# Patient Record
Sex: FEMALE | Race: BLACK OR AFRICAN AMERICAN | NOT HISPANIC OR LATINO | Employment: UNEMPLOYED | URBAN - METROPOLITAN AREA
[De-identification: names, ages, dates, MRNs, and addresses within clinical notes are randomized per-mention and may not be internally consistent; named-entity substitution may affect disease eponyms.]

---

## 2022-05-01 ENCOUNTER — ANESTHESIA EVENT (OUTPATIENT)
Dept: SURGERY | Facility: HOSPITAL | Age: 42
DRG: 746 | End: 2022-05-01
Payer: COMMERCIAL

## 2022-05-01 ENCOUNTER — HOSPITAL ENCOUNTER (INPATIENT)
Facility: HOSPITAL | Age: 42
LOS: 4 days | Discharge: NURSING, REHAB OR LONG-TERM CARE | DRG: 746 | End: 2022-05-05
Attending: SURGERY | Admitting: SURGERY
Payer: COMMERCIAL

## 2022-05-01 ENCOUNTER — ANESTHESIA (OUTPATIENT)
Dept: SURGERY | Facility: HOSPITAL | Age: 42
DRG: 746 | End: 2022-05-01
Payer: COMMERCIAL

## 2022-05-01 DIAGNOSIS — N76.82 FOURNIER'S GANGRENE IN FEMALE: ICD-10-CM

## 2022-05-01 PROBLEM — R65.10 SIRS (SYSTEMIC INFLAMMATORY RESPONSE SYNDROME): Status: ACTIVE | Noted: 2022-05-01

## 2022-05-01 PROBLEM — E11.9 NEWLY DIAGNOSED DIABETES: Status: ACTIVE | Noted: 2022-05-01

## 2022-05-01 PROBLEM — D63.8 ANEMIA, CHRONIC DISEASE: Status: ACTIVE | Noted: 2022-05-01

## 2022-05-01 PROBLEM — E11.65 UNCONTROLLED TYPE 2 DIABETES MELLITUS WITH HYPERGLYCEMIA: Status: ACTIVE | Noted: 2022-05-01

## 2022-05-01 LAB
ALBUMIN SERPL-MCNC: 2 GM/DL (ref 3.5–5)
ALBUMIN/GLOB SERPL: 0.5 RATIO (ref 1.1–2)
ALP SERPL-CCNC: 128 UNIT/L (ref 40–150)
ALT SERPL-CCNC: 13 UNIT/L (ref 0–55)
ANISOCYTOSIS BLD QL SMEAR: SLIGHT
APTT PPP: 37.1 SECONDS (ref 23.2–33.7)
AST SERPL-CCNC: 18 UNIT/L (ref 5–34)
B-HCG SERPL QL: NEGATIVE
BASOPHILS # BLD AUTO: 0.04 X10(3)/MCL (ref 0–0.2)
BASOPHILS NFR BLD AUTO: 0.1 %
BILIRUBIN DIRECT+TOT PNL SERPL-MCNC: 0.2 MG/DL (ref 0–0.5)
BILIRUBIN DIRECT+TOT PNL SERPL-MCNC: 0.2 MG/DL (ref 0–0.8)
BILIRUBIN DIRECT+TOT PNL SERPL-MCNC: 0.4 MG/DL
BUN SERPL-MCNC: 8 MG/DL (ref 7–18.7)
CALCIUM SERPL-MCNC: 8.4 MG/DL (ref 8.4–10.2)
CHLORIDE SERPL-SCNC: 109 MMOL/L (ref 98–107)
CO2 SERPL-SCNC: 22 MMOL/L (ref 22–29)
CREAT SERPL-MCNC: 0.77 MG/DL (ref 0.55–1.02)
EOSINOPHIL # BLD AUTO: 0.1 X10(3)/MCL (ref 0–0.9)
EOSINOPHIL NFR BLD AUTO: 0.4 %
ERYTHROCYTE [DISTWIDTH] IN BLOOD BY AUTOMATED COUNT: 15 % (ref 11.5–17)
GLOBULIN SER-MCNC: 4.1 GM/DL (ref 2.4–3.5)
GLUCOSE SERPL-MCNC: 256 MG/DL (ref 74–100)
GLUCOSE SERPL-MCNC: 265 MG/DL (ref 70–110)
HCT VFR BLD AUTO: 27.6 % (ref 37–47)
HGB BLD-MCNC: 8.8 GM/DL (ref 12–16)
HYPOCHROMIA BLD QL SMEAR: ABNORMAL
IMM GRANULOCYTES # BLD AUTO: 0.95 X10(3)/MCL (ref 0–0.02)
IMM GRANULOCYTES NFR BLD AUTO: 3.5 % (ref 0–0.43)
INR BLD: 1.54 (ref 0–1.3)
LYMPHOCYTES # BLD AUTO: 2.96 X10(3)/MCL (ref 0.6–4.6)
LYMPHOCYTES NFR BLD AUTO: 10.8 %
LYMPHOCYTES NFR BLD MANUAL: 17 % (ref 13–40)
LYMPHOCYTES NFR BLD MANUAL: 4658 /MCL (ref 3400–13700)
MCH RBC QN AUTO: 27.1 PG (ref 27–31)
MCHC RBC AUTO-ENTMCNC: 31.9 MG/DL (ref 33–36)
MCV RBC AUTO: 84.9 FL (ref 80–94)
MONOCYTES # BLD AUTO: 2.54 X10(3)/MCL (ref 0.1–1.3)
MONOCYTES NFR BLD AUTO: 9.3 %
MONOCYTES NFR BLD MANUAL: 2192 /MCL (ref 100–1300)
MONOCYTES NFR BLD MANUAL: 8 % (ref 2–11)
NEUTROPHILS # BLD AUTO: 20.8 X10(3)/MCL (ref 2.1–9.2)
NEUTROPHILS NFR BLD AUTO: 75.9 %
NEUTROPHILS NFR BLD MANUAL: 68 % (ref 47–80)
NEUTS BAND NFR BLD MANUAL: 7 % (ref 0–11)
PLATELET # BLD AUTO: 341 X10(3)/MCL (ref 130–400)
PLATELET # BLD EST: ADEQUATE 10*3/UL
PMV BLD AUTO: 10 FL (ref 9.4–12.4)
POCT GLUCOSE: 246 MG/DL (ref 70–110)
POCT GLUCOSE: 352 MG/DL (ref 70–110)
POCT GLUCOSE: 352 MG/DL (ref 70–110)
POTASSIUM SERPL-SCNC: 3.7 MMOL/L (ref 3.5–5.1)
PROT SERPL-MCNC: 6.1 GM/DL (ref 6.4–8.3)
PROTHROMBIN TIME: 18.4 SECONDS (ref 12.5–14.5)
RBC # BLD AUTO: 3.25 X10(6)/MCL (ref 4.2–5.4)
RBC MORPH BLD: ABNORMAL
SODIUM SERPL-SCNC: 137 MMOL/L (ref 136–145)
WBC # SPEC AUTO: 27.4 X10(3)/MCL (ref 4.5–11.5)

## 2022-05-01 PROCEDURE — 85025 COMPLETE CBC W/AUTO DIFF WBC: CPT | Performed by: STUDENT IN AN ORGANIZED HEALTH CARE EDUCATION/TRAINING PROGRAM

## 2022-05-01 PROCEDURE — 36415 COLL VENOUS BLD VENIPUNCTURE: CPT | Performed by: SURGERY

## 2022-05-01 PROCEDURE — 87102 FUNGUS ISOLATION CULTURE: CPT | Performed by: SURGERY

## 2022-05-01 PROCEDURE — 84703 CHORIONIC GONADOTROPIN ASSAY: CPT | Performed by: SURGERY

## 2022-05-01 PROCEDURE — 96366 THER/PROPH/DIAG IV INF ADDON: CPT

## 2022-05-01 PROCEDURE — 36415 COLL VENOUS BLD VENIPUNCTURE: CPT | Performed by: STUDENT IN AN ORGANIZED HEALTH CARE EDUCATION/TRAINING PROGRAM

## 2022-05-01 PROCEDURE — 71000033 HC RECOVERY, INTIAL HOUR: Performed by: SURGERY

## 2022-05-01 PROCEDURE — 63600175 PHARM REV CODE 636 W HCPCS

## 2022-05-01 PROCEDURE — 37000008 HC ANESTHESIA 1ST 15 MINUTES: Performed by: SURGERY

## 2022-05-01 PROCEDURE — 80053 COMPREHEN METABOLIC PANEL: CPT | Performed by: STUDENT IN AN ORGANIZED HEALTH CARE EDUCATION/TRAINING PROGRAM

## 2022-05-01 PROCEDURE — 96361 HYDRATE IV INFUSION ADD-ON: CPT

## 2022-05-01 PROCEDURE — 96376 TX/PRO/DX INJ SAME DRUG ADON: CPT

## 2022-05-01 PROCEDURE — 63600175 PHARM REV CODE 636 W HCPCS: Performed by: INTERNAL MEDICINE

## 2022-05-01 PROCEDURE — 63600175 PHARM REV CODE 636 W HCPCS: Performed by: NURSE ANESTHETIST, CERTIFIED REGISTERED

## 2022-05-01 PROCEDURE — 36000707: Performed by: SURGERY

## 2022-05-01 PROCEDURE — 25000003 PHARM REV CODE 250

## 2022-05-01 PROCEDURE — 87070 CULTURE OTHR SPECIMN AEROBIC: CPT | Performed by: SURGERY

## 2022-05-01 PROCEDURE — 37000009 HC ANESTHESIA EA ADD 15 MINS: Performed by: SURGERY

## 2022-05-01 PROCEDURE — 85730 THROMBOPLASTIN TIME PARTIAL: CPT | Performed by: STUDENT IN AN ORGANIZED HEALTH CARE EDUCATION/TRAINING PROGRAM

## 2022-05-01 PROCEDURE — 63600175 PHARM REV CODE 636 W HCPCS: Performed by: SURGERY

## 2022-05-01 PROCEDURE — 96367 TX/PROPH/DG ADDL SEQ IV INF: CPT

## 2022-05-01 PROCEDURE — 25000003 PHARM REV CODE 250: Performed by: SURGERY

## 2022-05-01 PROCEDURE — 85610 PROTHROMBIN TIME: CPT | Performed by: STUDENT IN AN ORGANIZED HEALTH CARE EDUCATION/TRAINING PROGRAM

## 2022-05-01 PROCEDURE — 25000003 PHARM REV CODE 250: Performed by: NURSE ANESTHETIST, CERTIFIED REGISTERED

## 2022-05-01 PROCEDURE — 25000003 PHARM REV CODE 250: Performed by: STUDENT IN AN ORGANIZED HEALTH CARE EDUCATION/TRAINING PROGRAM

## 2022-05-01 PROCEDURE — 96375 TX/PRO/DX INJ NEW DRUG ADDON: CPT

## 2022-05-01 PROCEDURE — 63600175 PHARM REV CODE 636 W HCPCS: Performed by: STUDENT IN AN ORGANIZED HEALTH CARE EDUCATION/TRAINING PROGRAM

## 2022-05-01 PROCEDURE — 99999 HC NO LEVEL OF SERVICE - ED ONLY: CPT

## 2022-05-01 PROCEDURE — C9399 UNCLASSIFIED DRUGS OR BIOLOG: HCPCS

## 2022-05-01 PROCEDURE — 11000001 HC ACUTE MED/SURG PRIVATE ROOM

## 2022-05-01 PROCEDURE — 96365 THER/PROPH/DIAG IV INF INIT: CPT

## 2022-05-01 PROCEDURE — 36000706: Performed by: SURGERY

## 2022-05-01 PROCEDURE — 87075 CULTR BACTERIA EXCEPT BLOOD: CPT | Performed by: SURGERY

## 2022-05-01 RX ORDER — SODIUM CHLORIDE 9 MG/ML
INJECTION, SOLUTION INTRAVENOUS CONTINUOUS
Status: DISCONTINUED | OUTPATIENT
Start: 2022-05-01 | End: 2022-05-05 | Stop reason: HOSPADM

## 2022-05-01 RX ORDER — INSULIN GLARGINE 100 [IU]/ML
INJECTION, SOLUTION SUBCUTANEOUS
Status: DISPENSED
Start: 2022-05-01 | End: 2022-05-01

## 2022-05-01 RX ORDER — HYDROMORPHONE HYDROCHLORIDE 2 MG/ML
INJECTION, SOLUTION INTRAMUSCULAR; INTRAVENOUS; SUBCUTANEOUS
Status: DISCONTINUED | OUTPATIENT
Start: 2022-05-01 | End: 2022-05-01

## 2022-05-01 RX ORDER — ONDANSETRON 2 MG/ML
4 INJECTION INTRAMUSCULAR; INTRAVENOUS EVERY 8 HOURS PRN
Status: DISCONTINUED | OUTPATIENT
Start: 2022-05-01 | End: 2022-05-05 | Stop reason: HOSPADM

## 2022-05-01 RX ORDER — TALC
6 POWDER (GRAM) TOPICAL NIGHTLY PRN
Status: DISCONTINUED | OUTPATIENT
Start: 2022-05-01 | End: 2022-05-05 | Stop reason: HOSPADM

## 2022-05-01 RX ORDER — KETOROLAC TROMETHAMINE 30 MG/ML
30 INJECTION, SOLUTION INTRAMUSCULAR; INTRAVENOUS EVERY 6 HOURS PRN
Status: DISPENSED | OUTPATIENT
Start: 2022-05-01 | End: 2022-05-04

## 2022-05-01 RX ORDER — INSULIN LISPRO 100 [IU]/ML
INJECTION, SOLUTION INTRAVENOUS; SUBCUTANEOUS
Status: COMPLETED
Start: 2022-05-01 | End: 2022-05-01

## 2022-05-01 RX ORDER — ONDANSETRON 2 MG/ML
4 INJECTION INTRAMUSCULAR; INTRAVENOUS EVERY 12 HOURS PRN
Status: DISCONTINUED | OUTPATIENT
Start: 2022-05-01 | End: 2022-05-05 | Stop reason: HOSPADM

## 2022-05-01 RX ORDER — LIDOCAINE HCL/PF 100 MG/5ML
SYRINGE (ML) INTRAVENOUS
Status: DISCONTINUED | OUTPATIENT
Start: 2022-05-01 | End: 2022-05-01

## 2022-05-01 RX ORDER — HYDROCODONE BITARTRATE AND ACETAMINOPHEN 5; 325 MG/1; MG/1
1 TABLET ORAL EVERY 8 HOURS PRN
Status: DISCONTINUED | OUTPATIENT
Start: 2022-05-01 | End: 2022-05-05 | Stop reason: HOSPADM

## 2022-05-01 RX ORDER — SODIUM CHLORIDE 9 MG/ML
INJECTION, SOLUTION INTRAVENOUS CONTINUOUS
Status: CANCELLED | OUTPATIENT
Start: 2022-05-01

## 2022-05-01 RX ORDER — PROMETHAZINE HYDROCHLORIDE 25 MG/ML
6.25 INJECTION, SOLUTION INTRAMUSCULAR; INTRAVENOUS ONCE AS NEEDED
Status: CANCELLED | OUTPATIENT
Start: 2022-05-01 | End: 2033-09-27

## 2022-05-01 RX ORDER — INSULIN ASPART 100 [IU]/ML
1-10 INJECTION, SOLUTION INTRAVENOUS; SUBCUTANEOUS EVERY 6 HOURS PRN
Status: DISCONTINUED | OUTPATIENT
Start: 2022-05-01 | End: 2022-05-05 | Stop reason: HOSPADM

## 2022-05-01 RX ORDER — SODIUM CHLORIDE 0.9 % (FLUSH) 0.9 %
10 SYRINGE (ML) INJECTION
Status: DISCONTINUED | OUTPATIENT
Start: 2022-05-01 | End: 2022-05-05 | Stop reason: HOSPADM

## 2022-05-01 RX ORDER — PROPOFOL 10 MG/ML
INJECTION, EMULSION INTRAVENOUS
Status: DISCONTINUED | OUTPATIENT
Start: 2022-05-01 | End: 2022-05-01

## 2022-05-01 RX ORDER — SILVER NITRATE 38.21; 12.74 MG/1; MG/1
STICK TOPICAL
Status: COMPLETED
Start: 2022-05-01 | End: 2022-05-01

## 2022-05-01 RX ORDER — MORPHINE SULFATE 4 MG/ML
2 INJECTION, SOLUTION INTRAMUSCULAR; INTRAVENOUS EVERY 4 HOURS PRN
Status: DISCONTINUED | OUTPATIENT
Start: 2022-05-01 | End: 2022-05-05 | Stop reason: HOSPADM

## 2022-05-01 RX ORDER — KETOROLAC TROMETHAMINE 30 MG/ML
30 INJECTION, SOLUTION INTRAMUSCULAR; INTRAVENOUS EVERY 8 HOURS PRN
Status: DISCONTINUED | OUTPATIENT
Start: 2022-05-01 | End: 2022-05-01

## 2022-05-01 RX ORDER — HYDROMORPHONE HYDROCHLORIDE 2 MG/ML
0.5 INJECTION, SOLUTION INTRAMUSCULAR; INTRAVENOUS; SUBCUTANEOUS
Status: CANCELLED | OUTPATIENT
Start: 2022-05-01

## 2022-05-01 RX ORDER — IBUPROFEN 600 MG/1
600 TABLET ORAL EVERY 6 HOURS PRN
Status: DISCONTINUED | OUTPATIENT
Start: 2022-05-01 | End: 2022-05-01

## 2022-05-01 RX ORDER — MORPHINE SULFATE 4 MG/ML
4 INJECTION, SOLUTION INTRAMUSCULAR; INTRAVENOUS EVERY 4 HOURS PRN
Status: DISCONTINUED | OUTPATIENT
Start: 2022-05-01 | End: 2022-05-01

## 2022-05-01 RX ORDER — DEXAMETHASONE SODIUM PHOSPHATE 4 MG/ML
INJECTION, SOLUTION INTRA-ARTICULAR; INTRALESIONAL; INTRAMUSCULAR; INTRAVENOUS; SOFT TISSUE
Status: DISCONTINUED | OUTPATIENT
Start: 2022-05-01 | End: 2022-05-01

## 2022-05-01 RX ORDER — TIZANIDINE 4 MG/1
4 TABLET ORAL EVERY 8 HOURS PRN
Status: DISCONTINUED | OUTPATIENT
Start: 2022-05-01 | End: 2022-05-05 | Stop reason: HOSPADM

## 2022-05-01 RX ORDER — SILVER NITRATE 38.21; 12.74 MG/1; MG/1
STICK TOPICAL
Status: DISPENSED
Start: 2022-05-01 | End: 2022-05-02

## 2022-05-01 RX ORDER — PIPERACILLIN SODIUM, TAZOBACTAM SODIUM 4; .5 G/20ML; G/20ML
INJECTION, POWDER, LYOPHILIZED, FOR SOLUTION INTRAVENOUS
Status: DISPENSED
Start: 2022-05-01 | End: 2022-05-01

## 2022-05-01 RX ORDER — HYDROMORPHONE HYDROCHLORIDE 2 MG/ML
0.2 INJECTION, SOLUTION INTRAMUSCULAR; INTRAVENOUS; SUBCUTANEOUS EVERY 4 HOURS PRN
Status: DISCONTINUED | OUTPATIENT
Start: 2022-05-01 | End: 2022-05-05 | Stop reason: HOSPADM

## 2022-05-01 RX ORDER — GLUCAGON 1 MG
1 KIT INJECTION
Status: DISCONTINUED | OUTPATIENT
Start: 2022-05-01 | End: 2022-05-05 | Stop reason: HOSPADM

## 2022-05-01 RX ORDER — FENTANYL CITRATE 50 UG/ML
INJECTION, SOLUTION INTRAMUSCULAR; INTRAVENOUS
Status: DISCONTINUED | OUTPATIENT
Start: 2022-05-01 | End: 2022-05-01

## 2022-05-01 RX ORDER — LABETALOL HYDROCHLORIDE 5 MG/ML
20 INJECTION, SOLUTION INTRAVENOUS ONCE
Status: CANCELLED | OUTPATIENT
Start: 2022-05-01 | End: 2022-05-01

## 2022-05-01 RX ORDER — MUPIROCIN 20 MG/G
OINTMENT TOPICAL 2 TIMES DAILY
Status: DISCONTINUED | OUTPATIENT
Start: 2022-05-01 | End: 2022-05-05 | Stop reason: HOSPADM

## 2022-05-01 RX ORDER — AMOXICILLIN 250 MG
1 CAPSULE ORAL 2 TIMES DAILY
Status: DISCONTINUED | OUTPATIENT
Start: 2022-05-01 | End: 2022-05-05 | Stop reason: HOSPADM

## 2022-05-01 RX ORDER — TRANEXAMIC ACID 100 MG/ML
INJECTION, SOLUTION INTRAVENOUS
Status: DISCONTINUED | OUTPATIENT
Start: 2022-05-01 | End: 2022-05-01

## 2022-05-01 RX ADMIN — DEXAMETHASONE SODIUM PHOSPHATE 4 MG: 4 INJECTION, SOLUTION INTRA-ARTICULAR; INTRALESIONAL; INTRAMUSCULAR; INTRAVENOUS; SOFT TISSUE at 07:05

## 2022-05-01 RX ADMIN — INSULIN LISPRO 10 UNITS: 100 INJECTION, SOLUTION INTRAVENOUS; SUBCUTANEOUS at 05:05

## 2022-05-01 RX ADMIN — PIPERACILLIN SODIUM AND TAZOBACTAM SODIUM 4.5 G: 4; 500 INJECTION, POWDER, FOR SOLUTION INTRAVENOUS at 11:05

## 2022-05-01 RX ADMIN — KETOROLAC TROMETHAMINE 30 MG: 30 INJECTION, SOLUTION INTRAMUSCULAR; INTRAVENOUS at 08:05

## 2022-05-01 RX ADMIN — MORPHINE SULFATE 2 MG: 4 INJECTION INTRAVENOUS at 05:05

## 2022-05-01 RX ADMIN — KETOROLAC TROMETHAMINE 30 MG: 30 INJECTION, SOLUTION INTRAMUSCULAR; INTRAVENOUS at 02:05

## 2022-05-01 RX ADMIN — HYDROMORPHONE HYDROCHLORIDE 1 MG: 2 INJECTION INTRAMUSCULAR; INTRAVENOUS; SUBCUTANEOUS at 07:05

## 2022-05-01 RX ADMIN — VANCOMYCIN HYDROCHLORIDE 1500 MG: 1 INJECTION, POWDER, LYOPHILIZED, FOR SOLUTION INTRAVENOUS at 03:05

## 2022-05-01 RX ADMIN — SODIUM CHLORIDE: 9 INJECTION, SOLUTION INTRAVENOUS at 02:05

## 2022-05-01 RX ADMIN — Medication 60 MG: at 07:05

## 2022-05-01 RX ADMIN — MUPIROCIN: 20 OINTMENT TOPICAL at 02:05

## 2022-05-01 RX ADMIN — ONDANSETRON 4 MG: 2 INJECTION INTRAMUSCULAR; INTRAVENOUS at 05:05

## 2022-05-01 RX ADMIN — PROPOFOL 200 MG: 10 INJECTION, EMULSION INTRAVENOUS at 07:05

## 2022-05-01 RX ADMIN — PIPERACILLIN SODIUM AND TAZOBACTAM SODIUM 4.5 G: 2; .25 INJECTION, POWDER, LYOPHILIZED, FOR SOLUTION INTRAVENOUS at 06:05

## 2022-05-01 RX ADMIN — TRANEXAMIC ACID 1000 MG: 100 INJECTION, SOLUTION INTRAVENOUS at 07:05

## 2022-05-01 RX ADMIN — INSULIN ASPART 6 UNITS: 100 INJECTION, SOLUTION INTRAVENOUS; SUBCUTANEOUS at 02:05

## 2022-05-01 RX ADMIN — FENTANYL CITRATE 100 MCG: 50 INJECTION, SOLUTION INTRAMUSCULAR; INTRAVENOUS at 06:05

## 2022-05-01 RX ADMIN — ONDANSETRON 4 MG: 2 INJECTION INTRAMUSCULAR; INTRAVENOUS at 07:05

## 2022-05-01 RX ADMIN — KETOROLAC TROMETHAMINE 30 MG: 30 INJECTION, SOLUTION INTRAMUSCULAR; INTRAVENOUS at 09:05

## 2022-05-01 NOTE — PROGRESS NOTES
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 Given Bactrim by PCP on 4/25. Found to have very large abscess on exam 26.5cm x 6cm. CT A/P showed locules of gas in right ischiorectal fossa extending into right gluteal region suspicious for Ken's Gangrene. A1c 10.4 and Gluc 438. WBC 30.6 and . [facility] [place] for new dx DM.      Overview/Hospital Course:  Pt to undergo I&D in OR soon.      Interval History: Denies any acute complaints    Review of Systems: 10 systems reviewed all pertinent positives and negatives stated in HPI, otherwise negative.    Objective:     Vital Signs (Most Recent):  Temp: 98.2 °F (36.8 °C) (05/01/22 1700)  Pulse: 78 (05/01/22 1700)  Resp: 18 (05/01/22 1700)  BP: (!) 143/84 (05/01/22 1700)  SpO2: (!) 94 % (05/01/22 1700)   Vital Signs (24h Range):  Temp:  [98.2 °F (36.8 °C)-100.3 °F (37.9 °C)] 98.2 °F (36.8 °C)  Pulse:  [] 78  Resp:  [18-20] 18  SpO2:  [94 %-100 %] 94 %  BP: (124-149)/(81-84) 143/84     Weight: 131.1 kg (289 lb 0.4 oz)  Body mass index is 45.27 kg/m².    Intake/Output Summary (Last 24 hours) at 5/1/2022 1846  Last data filed at 5/1/2022 1521  Gross per 24 hour   Intake --   Output 2650 ml   Net -2650 ml      Physical Exam  Constitutional:       Appearance: Normal appearance. She is obese. She is not toxic-appearing.   HENT:      Head: Normocephalic and atraumatic.      Nose: Nose normal.      Mouth/Throat:      Mouth: Mucous membranes are moist.      Pharynx: Oropharynx is clear.   Eyes:       Conjunctiva/sclera: Conjunctivae normal.      Pupils: Pupils are equal, round, and reactive to light.   Cardiovascular:      Rate and Rhythm: Regular rhythm. Tachycardia present.      Pulses: Normal pulses.   Pulmonary:      Effort: Pulmonary effort is normal.      Breath sounds: Normal breath sounds. No wheezing, rhonchi or rales.   Abdominal:      General: Bowel sounds are normal.      Palpations: Abdomen is soft.   Musculoskeletal:         General: No swelling. Normal range of motion.      Cervical back: Normal range of motion and neck supple.   Skin:     General: Skin is warm and dry.   Neurological:      General: No focal deficit present.      Mental Status: She is alert and oriented to person, place, and time. Mental status is at baseline.   Psychiatric:         Mood and Affect: Mood normal.         Behavior: Behavior normal.         Thought Content: Thought content normal.         Judgment: Judgment normal.       Significant Labs: All pertinent labs within the past 24 hours have been reviewed.  Recent Lab Results  (Last 5 results in the past 24 hours)        05/01/22  1732   05/01/22  1017   05/01/22  0930   05/01/22  0913   05/01/22  0523        Abs Lymph   2.96             Abs Neutro   20.8             Albumin   2.0             Albumin/Globulin Ratio   0.5             Alkaline Phosphatase   128             ALT   13             Aniso   Slight             aPTT   37.1             AST   18             Bands   7             Baso #   0.04             Basophil %   0.1             Bilirubin, Direct   0.2             Bilirubin, Indirect   0.20             BILIRUBIN TOTAL   0.4             BUN   8.0             Calcium   8.4             Chloride   109             CO2   22             Creatinine   0.77             eGFR if non    >60             Eos #   0.10             Eosinophil %   0.4             Globulin, Total   4.1             Glucose   256             Hematocrit   27.6             Hemoglobin    8.8             Hypo   2+             Immature Grans (Abs)   0.95             Immature Granulocytes   3.5             INR   1.54             Lymph #   4,658             Lymph Auto   10.8             Lymph Man   17             MCH   27.1             MCHC   31.9             MCV   84.9             Mono #   2.54                2,192             Mono %   9.3                8             MPV   10.0             Neutrophils Relative   75.9                68             Platelet Estimate   Adequate             Platelets   341             POC Glucose     265           POCT Glucose       246   352                352       Potassium   3.7             Preg, Serum Negative               PROTEIN TOTAL   6.1             Protime   18.4             RBC   3.25             RBC Morph   Abnormal             RDW   15.0             Sodium   137             WBC   27.4                                    Significant Imaging: I have reviewed all pertinent imaging results/findings within the past 24 hours.      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  - GenSurg for I&D  - IV Vanc/Zosyn  - wound care  - analgesia      Anemia, chronic disease  - monitor      Uncontrolled type 2 diabetes mellitus with hyperglycemia  - SSI      Newly diagnosed diabetes  - SSI      SIRS (systemic inflammatory response syndrome)  - IVF  - abx  - monitor  - am labs        VTE Risk Mitigation (From admission, onward)         Ordered     Place sequential compression device  Until discontinued         05/01/22 0226     IP VTE LOW RISK PATIENT  Once         05/01/22 0226                Discharge Planning   AMAURY:      Code Status: Full Code   Is the patient medically ready for discharge?:     Reason for patient still in hospital (select all that apply): Treatment                     Shaun Sahu MD  Department of Hospital Medicine   [facility] - Medical Surgical Unit

## 2022-05-01 NOTE — NURSING
PT right buttocks abscess ruptured around 1845. Surgery nurse informed before taking down to I&D procedure. I applied ABD pads and foam tape to clot  the drainage until the procedure.

## 2022-05-01 NOTE — SUBJECTIVE & OBJECTIVE
Interval History: Denies any acute complaints    Review of Systems: 10 systems reviewed all pertinent positives and negatives stated in HPI, otherwise negative.    Objective:     Vital Signs (Most Recent):  Temp: 98.2 °F (36.8 °C) (05/01/22 1700)  Pulse: 78 (05/01/22 1700)  Resp: 18 (05/01/22 1700)  BP: (!) 143/84 (05/01/22 1700)  SpO2: (!) 94 % (05/01/22 1700)   Vital Signs (24h Range):  Temp:  [98.2 °F (36.8 °C)-100.3 °F (37.9 °C)] 98.2 °F (36.8 °C)  Pulse:  [] 78  Resp:  [18-20] 18  SpO2:  [94 %-100 %] 94 %  BP: (124-149)/(81-84) 143/84     Weight: 131.1 kg (289 lb 0.4 oz)  Body mass index is 45.27 kg/m².    Intake/Output Summary (Last 24 hours) at 5/1/2022 1846  Last data filed at 5/1/2022 1521  Gross per 24 hour   Intake --   Output 2650 ml   Net -2650 ml      Physical Exam  Constitutional:       Appearance: Normal appearance. She is obese. She is not toxic-appearing.   HENT:      Head: Normocephalic and atraumatic.      Nose: Nose normal.      Mouth/Throat:      Mouth: Mucous membranes are moist.      Pharynx: Oropharynx is clear.   Eyes:      Conjunctiva/sclera: Conjunctivae normal.      Pupils: Pupils are equal, round, and reactive to light.   Cardiovascular:      Rate and Rhythm: Regular rhythm. Tachycardia present.      Pulses: Normal pulses.   Pulmonary:      Effort: Pulmonary effort is normal.      Breath sounds: Normal breath sounds. No wheezing, rhonchi or rales.   Abdominal:      General: Bowel sounds are normal.      Palpations: Abdomen is soft.   Musculoskeletal:         General: No swelling. Normal range of motion.      Cervical back: Normal range of motion and neck supple.   Skin:     General: Skin is warm and dry.   Neurological:      General: No focal deficit present.      Mental Status: She is alert and oriented to person, place, and time. Mental status is at baseline.   Psychiatric:         Mood and Affect: Mood normal.         Behavior: Behavior normal.         Thought Content: Thought  content normal.         Judgment: Judgment normal.       Significant Labs: All pertinent labs within the past 24 hours have been reviewed.  Recent Lab Results  (Last 5 results in the past 24 hours)        05/01/22  1732   05/01/22  1017   05/01/22  0930   05/01/22  0913   05/01/22  0523        Abs Lymph   2.96             Abs Neutro   20.8             Albumin   2.0             Albumin/Globulin Ratio   0.5             Alkaline Phosphatase   128             ALT   13             Aniso   Slight             aPTT   37.1             AST   18             Bands   7             Baso #   0.04             Basophil %   0.1             Bilirubin, Direct   0.2             Bilirubin, Indirect   0.20             BILIRUBIN TOTAL   0.4             BUN   8.0             Calcium   8.4             Chloride   109             CO2   22             Creatinine   0.77             eGFR if non    >60             Eos #   0.10             Eosinophil %   0.4             Globulin, Total   4.1             Glucose   256             Hematocrit   27.6             Hemoglobin   8.8             Hypo   2+             Immature Grans (Abs)   0.95             Immature Granulocytes   3.5             INR   1.54             Lymph #   4,658             Lymph Auto   10.8             Lymph Man   17             MCH   27.1             MCHC   31.9             MCV   84.9             Mono #   2.54                2,192             Mono %   9.3                8             MPV   10.0             Neutrophils Relative   75.9                68             Platelet Estimate   Adequate             Platelets   341             POC Glucose     265           POCT Glucose       246   352                352       Potassium   3.7             Preg, Serum Negative               PROTEIN TOTAL   6.1             Protime   18.4             RBC   3.25             RBC Morph   Abnormal             RDW   15.0             Sodium   137             WBC   27.4                                     Significant Imaging: I have reviewed all pertinent imaging results/findings within the past 24 hours.

## 2022-05-01 NOTE — ANESTHESIA PREPROCEDURE EVALUATION
05/01/2022  Shell Rolon is a 41 y.o., female.      Pre-op Assessment    I have reviewed the Patient Summary Reports.     I have reviewed the Nursing Notes. I have reviewed the NPO Status.   I have reviewed the Medications.     Review of Systems  Anesthesia Hx:  No problems with previous Anesthesia  History of prior surgery of interest to airway management or planning: Previous anesthesia: General, Spinal   Social:  Former Smoker    Cardiovascular:   Hypertension, poorly controlled    Pulmonary:   Asthma mild Has not used an inhaler in about 2 years   Endocrine:   Diabetes, poorly controlled, type 2        Physical Exam  General: Well nourished, Cooperative, Alert and Oriented    Airway:  Mallampati: II   Mouth Opening: Normal  TM Distance: Normal  Tongue: Normal  Neck ROM: Normal ROM    Dental:  Intact        Anesthesia Plan  Type of Anesthesia, risks & benefits discussed:    Anesthesia Type: Gen Supraglottic Airway  Intra-op Monitoring Plan: Standard ASA Monitors  Induction:  IV  Airway Plan: , Post-Induction  Informed Consent: Informed consent signed with the Patient and all parties understand the risks and agree with anesthesia plan.  All questions answered. Patient consented to blood products? Yes  ASA Score: 3 Emergent  Day of Surgery Review of History & Physical: I have interviewed and examined the patient. I have reviewed the patient's H&P dated: There are no significant changes.     Ready For Surgery From Anesthesia Perspective.     .

## 2022-05-01 NOTE — HPI
40yo BF with h/o HTN, Morbid Obesity, new dx of DM2 admitted presented with Abscess of Left Buttock near Perirectal area worsening over 10 days. Given Bactrim by PCP on 4/25. Found to have very large abscess on exam 26.5cm x 6cm. CT A/P showed locules of gas in right ischiorectal fossa extending into right gluteal region suspicious for Ken's Gangrene. A1c 10.4 and Gluc 438. WBC 30.6 and . [facility] [place] consulted for new dx DM.

## 2022-05-01 NOTE — HOSPITAL COURSE
41-year-old  female past medical history of new onset diabetes, hypertension, morbid abuse was admitted to the hospital with the rectal pain abscess worsening.  Patient has been having this issue for about 10 days prior to arriving to the hospital.  On 04/25/2022 she was started on p.o. Bactrim.  She had an abscess of 26 x 5 cm and 6 cm.  A CT showed loculated gas in the right ischial rectal fossa extending into the right gluteal region.  Finding compatible with Ken's gangrene.  Her blood work showed elevation of wbc 30.6 and her hemoglobin A1c was 10.4 and blood sugar on arrival was 430 8. Her vital signs were stable.  She had a fever of 100.3 at the most.   Patient underwent surgical intervention.  She had incision remained of the gluteal abscess.  She was started on IV antibiotic to cover for g negatives, g positives and anaerobes.  During the hospital stay noticed the patient had only been and hematocrit was in the low side as well.  Wound Care was consulted and patient continued to appear improving.   Due to the severely of conditions and her in for further treatment patient was recommended to go to LTAC.  Today she is being transferred to long-term care facility where she will continue treatment for Ken's gangrene, diabetes and close monitoring of anemia.  In addition to that patient requires strengthening and pain management.

## 2022-05-02 PROBLEM — K61.0 PERIANAL ABSCESS: Status: ACTIVE | Noted: 2022-05-01

## 2022-05-02 LAB
ANION GAP SERPL CALC-SCNC: 9 MEQ/L
BASOPHILS # BLD AUTO: 0.02 X10(3)/MCL (ref 0–0.2)
BASOPHILS NFR BLD AUTO: 0.1 %
BUN SERPL-MCNC: 11 MG/DL (ref 7–18.7)
CALCIUM SERPL-MCNC: 7.9 MG/DL (ref 8.4–10.2)
CHLORIDE SERPL-SCNC: 108 MMOL/L (ref 98–107)
CO2 SERPL-SCNC: 22 MMOL/L (ref 22–29)
CREAT SERPL-MCNC: 0.93 MG/DL (ref 0.55–1.02)
CREAT/UREA NIT SERPL: 12
EOSINOPHIL # BLD AUTO: 0.01 X10(3)/MCL (ref 0–0.9)
EOSINOPHIL NFR BLD AUTO: 0 %
ERYTHROCYTE [DISTWIDTH] IN BLOOD BY AUTOMATED COUNT: 15 % (ref 11.5–17)
GLUCOSE SERPL-MCNC: 312 MG/DL (ref 74–100)
GLUCOSE SERPL-MCNC: 323 MG/DL (ref 70–110)
HCT VFR BLD AUTO: 26 % (ref 37–47)
HGB BLD-MCNC: 8.3 GM/DL (ref 12–16)
IMM GRANULOCYTES # BLD AUTO: 0.58 X10(3)/MCL (ref 0–0.02)
IMM GRANULOCYTES NFR BLD AUTO: 2.4 % (ref 0–0.43)
LYMPHOCYTES # BLD AUTO: 1.98 X10(3)/MCL (ref 0.6–4.6)
LYMPHOCYTES NFR BLD AUTO: 8.2 %
LYMPHOCYTES NFR BLD MANUAL: 1936 /MCL (ref 3400–13700)
LYMPHOCYTES NFR BLD MANUAL: 8 % (ref 13–40)
MCH RBC QN AUTO: 26.9 PG (ref 27–31)
MCHC RBC AUTO-ENTMCNC: 31.9 MG/DL (ref 33–36)
MCV RBC AUTO: 84.1 FL (ref 80–94)
MONOCYTES # BLD AUTO: 1.44 X10(3)/MCL (ref 0.1–1.3)
MONOCYTES NFR BLD AUTO: 6 %
MONOCYTES NFR BLD MANUAL: 1210 /MCL (ref 100–1300)
MONOCYTES NFR BLD MANUAL: 5 % (ref 2–11)
NEUTROPHILS # BLD AUTO: 20.2 X10(3)/MCL (ref 2.1–9.2)
NEUTROPHILS NFR BLD AUTO: 83.3 %
NEUTROPHILS NFR BLD MANUAL: 85 % (ref 47–80)
NEUTS BAND NFR BLD MANUAL: 2 % (ref 0–11)
PLATELET # BLD AUTO: 335 X10(3)/MCL (ref 130–400)
PLATELET # BLD EST: ADEQUATE 10*3/UL
PMV BLD AUTO: 9.8 FL (ref 9.4–12.4)
POCT GLUCOSE: 240 MG/DL (ref 70–110)
POCT GLUCOSE: 427 MG/DL (ref 70–110)
POCT GLUCOSE: 448 MG/DL (ref 70–110)
POTASSIUM SERPL-SCNC: 4.6 MMOL/L (ref 3.5–5.1)
RBC # BLD AUTO: 3.09 X10(6)/MCL (ref 4.2–5.4)
RBC MORPH BLD: NORMAL
SODIUM SERPL-SCNC: 139 MMOL/L (ref 136–145)
WBC # SPEC AUTO: 24.2 X10(3)/MCL (ref 4.5–11.5)

## 2022-05-02 PROCEDURE — 25000003 PHARM REV CODE 250: Performed by: STUDENT IN AN ORGANIZED HEALTH CARE EDUCATION/TRAINING PROGRAM

## 2022-05-02 PROCEDURE — 63600175 PHARM REV CODE 636 W HCPCS: Performed by: STUDENT IN AN ORGANIZED HEALTH CARE EDUCATION/TRAINING PROGRAM

## 2022-05-02 PROCEDURE — C9399 UNCLASSIFIED DRUGS OR BIOLOG: HCPCS | Performed by: STUDENT IN AN ORGANIZED HEALTH CARE EDUCATION/TRAINING PROGRAM

## 2022-05-02 PROCEDURE — 63600175 PHARM REV CODE 636 W HCPCS: Performed by: INTERNAL MEDICINE

## 2022-05-02 PROCEDURE — 36415 COLL VENOUS BLD VENIPUNCTURE: CPT | Performed by: INTERNAL MEDICINE

## 2022-05-02 PROCEDURE — 97161 PT EVAL LOW COMPLEX 20 MIN: CPT

## 2022-05-02 PROCEDURE — 25000003 PHARM REV CODE 250: Performed by: SURGERY

## 2022-05-02 PROCEDURE — 94761 N-INVAS EAR/PLS OXIMETRY MLT: CPT

## 2022-05-02 PROCEDURE — 25000003 PHARM REV CODE 250

## 2022-05-02 PROCEDURE — 11000001 HC ACUTE MED/SURG PRIVATE ROOM

## 2022-05-02 PROCEDURE — 63600175 PHARM REV CODE 636 W HCPCS: Performed by: SURGERY

## 2022-05-02 PROCEDURE — 85025 COMPLETE CBC W/AUTO DIFF WBC: CPT | Performed by: INTERNAL MEDICINE

## 2022-05-02 PROCEDURE — 80048 BASIC METABOLIC PNL TOTAL CA: CPT | Performed by: INTERNAL MEDICINE

## 2022-05-02 RX ORDER — HYDROCODONE BITARTRATE AND ACETAMINOPHEN 5; 325 MG/1; MG/1
TABLET ORAL
Status: DISPENSED
Start: 2022-05-02 | End: 2022-05-02

## 2022-05-02 RX ORDER — HYDROCODONE BITARTRATE AND ACETAMINOPHEN 5; 325 MG/1; MG/1
TABLET ORAL
Status: COMPLETED
Start: 2022-05-02 | End: 2022-05-02

## 2022-05-02 RX ADMIN — ONDANSETRON 4 MG: 2 INJECTION INTRAMUSCULAR; INTRAVENOUS at 09:05

## 2022-05-02 RX ADMIN — INSULIN ASPART 10 UNITS: 100 INJECTION, SOLUTION INTRAVENOUS; SUBCUTANEOUS at 04:05

## 2022-05-02 RX ADMIN — SODIUM CHLORIDE: 9 INJECTION, SOLUTION INTRAVENOUS at 04:05

## 2022-05-02 RX ADMIN — MUPIROCIN: 20 OINTMENT TOPICAL at 09:05

## 2022-05-02 RX ADMIN — TIZANIDINE 4 MG: 4 TABLET ORAL at 09:05

## 2022-05-02 RX ADMIN — PIPERACILLIN SODIUM AND TAZOBACTAM SODIUM 4.5 G: 4; 500 INJECTION, POWDER, FOR SOLUTION INTRAVENOUS at 11:05

## 2022-05-02 RX ADMIN — PIPERACILLIN SODIUM AND TAZOBACTAM SODIUM 4.5 G: 4; 500 INJECTION, POWDER, FOR SOLUTION INTRAVENOUS at 03:05

## 2022-05-02 RX ADMIN — INSULIN ASPART 5 UNITS: 100 INJECTION, SOLUTION INTRAVENOUS; SUBCUTANEOUS at 09:05

## 2022-05-02 RX ADMIN — VANCOMYCIN HYDROCHLORIDE 1500 MG: 1 INJECTION, POWDER, LYOPHILIZED, FOR SOLUTION INTRAVENOUS at 05:05

## 2022-05-02 RX ADMIN — SENNOSIDES, DOCUSATE SODIUM 1 TABLET: 8.6; 5 TABLET ORAL at 09:05

## 2022-05-02 RX ADMIN — HYDROCODONE BITARTRATE AND ACETAMINOPHEN 1 TABLET: 5; 325 TABLET ORAL at 10:05

## 2022-05-02 RX ADMIN — PIPERACILLIN SODIUM AND TAZOBACTAM SODIUM 4.5 G: 4; 500 INJECTION, POWDER, FOR SOLUTION INTRAVENOUS at 09:05

## 2022-05-02 RX ADMIN — INSULIN DETEMIR 20 UNITS: 100 INJECTION, SOLUTION SUBCUTANEOUS at 09:05

## 2022-05-02 RX ADMIN — INSULIN ASPART 10 UNITS: 100 INJECTION, SOLUTION INTRAVENOUS; SUBCUTANEOUS at 12:05

## 2022-05-02 RX ADMIN — HYDROMORPHONE HYDROCHLORIDE 0.2 MG: 2 INJECTION INTRAMUSCULAR; INTRAVENOUS; SUBCUTANEOUS at 04:05

## 2022-05-02 RX ADMIN — KETOROLAC TROMETHAMINE 30 MG: 30 INJECTION, SOLUTION INTRAMUSCULAR; INTRAVENOUS at 11:05

## 2022-05-02 RX ADMIN — HYDROMORPHONE HYDROCHLORIDE 0.2 MG: 2 INJECTION INTRAMUSCULAR; INTRAVENOUS; SUBCUTANEOUS at 09:05

## 2022-05-02 RX ADMIN — HYDROMORPHONE HYDROCHLORIDE 0.2 MG: 2 INJECTION INTRAMUSCULAR; INTRAVENOUS; SUBCUTANEOUS at 12:05

## 2022-05-02 NOTE — ASSESSMENT & PLAN NOTE
- s/p I&D large abscess  - IV Vanc/Zosyn  - f/u cx  - wound care  - analgesia  - referred to LTAC

## 2022-05-02 NOTE — TRANSFER OF CARE
"Anesthesia Transfer of Care Note    Patient: Shell Rolon    Procedure(s) Performed: Procedure(s) (LRB):  DEBRIDEMENT, WOUND (N/A)    Patient location: PACU    Anesthesia Type: general    Transport from OR: Transported from OR on room air with adequate spontaneous ventilation    Post assessment: no apparent anesthetic complications    Post vital signs: stable    Level of consciousness: awake    Transfer of care protocol was followed      Last vitals:   Visit Vitals  BP (!) 153/80 (BP Location: Right arm)   Pulse 108   Temp 37.8 °C (100 °F) (Tympanic)   Resp (!) 24   Ht 5' 7" (1.702 m)   Wt 131.1 kg (289 lb 0.4 oz)   SpO2 96%   Breastfeeding No   BMI 45.27 kg/m²     "

## 2022-05-02 NOTE — ANESTHESIA PROCEDURE NOTES
Intubation    Date/Time: 5/1/2022 7:01 PM  Performed by: Celestino Gonzalez CRNA  Authorized by: Celestino Gonzalez CRNA     Intubation:     Induction:  Intravenous    Intubated:  Postinduction    Mask Ventilation:  N/a    Attempts:  1    Attempted By:  CRNA    Difficult Airway Encountered?: No      Complications:  None    Airway Device:  Supraglottic airway/LMA    Airway Device Size:  4.0    Style/Cuff Inflation:  Uncuffed    Placement Verified By:  Capnometry    Complicating Factors:  None    Findings Post-Intubation:  BS equal bilateral and atraumatic/condition of teeth unchanged

## 2022-05-02 NOTE — SUBJECTIVE & OBJECTIVE
Interval History: S/P I&D yest. Feeling well. No acute complaints. CBG's remain elevated but expected to decr with tx of infection. BP with sub-optimal control. Hgb 8.3 from 8.8.    Review of Systems: 10 systems reviewed all pertinent positives and negatives stated in HPI, otherwise negative.   Objective:     Vital Signs (Most Recent):  Temp: 98.1 °F (36.7 °C) (05/02/22 1100)  Pulse: 89 (05/02/22 1100)  Resp: 18 (05/02/22 1210)  BP: 105/66 (05/02/22 1100)  SpO2: 100 % (05/02/22 1100) Vital Signs (24h Range):  Temp:  [97.9 °F (36.6 °C)-100 °F (37.8 °C)] 98.1 °F (36.7 °C)  Pulse:  [] 89  Resp:  [18-30] 18  SpO2:  [93 %-100 %] 100 %  BP: (105-162)/(66-94) 105/66     Weight: 131.1 kg (289 lb 0.4 oz)  Body mass index is 45.27 kg/m².    Intake/Output Summary (Last 24 hours) at 5/2/2022 1548  Last data filed at 5/2/2022 0525  Gross per 24 hour   Intake 800 ml   Output 2450 ml   Net -1650 ml      Physical Exam  GEN:  WNWD, Morbidly obese BF, non-toxic, appears comfortable  HEENT:  NCAT, MMM, EOMI, conjunctivae clear, anicteric sclerae, neck supple, no thyromegaly  CV:  RRR, no MRG, no carotid bruits, pulses palpable and equal bilat  CHEST:  Non-tender to palpation, no CVA tenderness, nml excursions, non-labored, equal air entry bilat, CTAB, no WRR  ABD:  BS+, NTND, no rebound/guarding, no ascites  EXTR:  no deformities, MAEW, strength equal bilat  SKIN:  no rashes, no CCE, warm, dry, turgor nml, Right buttock bandaged  NEURO:  A&Ox4, no focal deficits, CN II-XII intact grossly, reflexes nml  PSYCH:  calm, cooperative, responds appropriately, good insight, good judgement, nml fund of knowledge      Significant Labs: All pertinent labs within the past 24 hours have been reviewed.  Recent Lab Results  (Last 5 results in the past 24 hours)        05/02/22  1219   05/02/22  0500   05/02/22  0458   05/01/22  1956   05/01/22  1732        Abs Lymph     1.98           Abs Neutro     20.2           Anion Gap     9.0            Bands     2           Baso #     0.02           Basophil %     0.1           BUN     11.0           BUN/Creatinine Ratio     12           Calcium     7.9           Chloride     108           CO2     22           Creatinine     0.93           eGFR if non      >60           Eos #     0.01           Eosinophil %     0.0           Glucose     312           Hematocrit     26.0           Hemoglobin     8.3           Immature Grans (Abs)     0.58           Immature Granulocytes     2.4           Lymph #     1,936           Lymph Auto     8.2           Lymph Man     8           MCH     26.9           MCHC     31.9           MCV     84.1           Mono #     1.44                1,210           Mono %     6.0                5           MPV     9.8           Neutrophils Relative     83.3                85           Platelet Estimate     Adequate           Platelets     335           POC Glucose   323             POCT Glucose 427       240         Potassium     4.6           Preg, Serum         Negative       RBC     3.09           RBC Morph     Normal           RDW     15.0           Sodium     139           WBC     24.2                                  Significant Imaging: I have reviewed all pertinent imaging results/findings within the past 24 hours.

## 2022-05-02 NOTE — ANESTHESIA POSTPROCEDURE EVALUATION
Anesthesia Post Evaluation    Patient: Shell Rolon    Procedure(s) Performed: Procedure(s) (LRB):  DEBRIDEMENT, WOUND (N/A)    Final Anesthesia Type: general      Patient location during evaluation: PACU  Patient participation: Yes- Able to Participate  Level of consciousness: awake and alert  Post-procedure vital signs: reviewed and stable  Pain management: adequate  Airway patency: patent    PONV status at discharge: No PONV  Anesthetic complications: no      Cardiovascular status: stable  Respiratory status: unassisted  Hydration status: euvolemic  Follow-up not needed.          Vitals Value Taken Time   /79 05/01/22 1951   Temp 37.8 °C (100 °F) 05/01/22 1938   Pulse 105 05/01/22 1952   Resp 33 05/01/22 1952   SpO2 93 % 05/01/22 1952   Vitals shown include unvalidated device data.      No case tracking events are documented in the log.      Pain/Eunice Score: Pain Rating Prior to Med Admin: 5 (5/1/2022  2:32 PM)  Pain Rating Post Med Admin: 0 (5/1/2022  3:02 PM)

## 2022-05-02 NOTE — PLAN OF CARE
Spoke to pt and she stats she would like to remain here so agreeable to go to LTAC on 2nd as MD had discussed with her.   Message sent to Ms. Garcia with [facility] and she is awaiting authorization.

## 2022-05-02 NOTE — PROGRESS NOTES
[facility] - [facility] Surgical [facility]  Hospital Medicine  Progress Note    Patient Name: Shell Rolon  MRN: [id number]  Patient Class: IP- Inpatient   Admission Date: 5/1/2022  Length of Stay: 1 days  Attending Physician: Shaun Sahu MD  Primary Care Provider: Mackenzie Clay NP        Subjective:     Principal Problem:Perianal abscess        HPI:  42yo BF with h/o HTN, Morbid Obesity, new dx of DM2 admitted presented with Abscess of Left Buttock near Perirectal area worsening over 10 days. Given Bactrim by PCP on 4/25. Found to have very large abscess on exam 26.5cm x 6cm. CT A/P showed locules of gas in right ischiorectal fossa extending into right gluteal region suspicious for Ken's Gangrene. A1c 10.4 and Gluc 438. WBC 30.6 and . [facility] consulted for new dx DM.      Overview/Hospital Course:  Pt placed on IV Vanc/Zosyn. Underwent I&D in OR 5/1 of large Right Perianal Abscess (L)15cm x (W)5cm x (D)7cm. Pt referred to LTAC for continuation of care.      Interval History: S/P I&D yest. Feeling well. No acute complaints. CBG's remain elevated but expected to decr with tx of infection. BP with sub-optimal control. Hgb 8.3 from 8.8.    Review of Systems: 10 systems reviewed all pertinent positives and negatives stated in HPI, otherwise negative.   Objective:     Vital Signs (Most Recent):  Temp: 98.1 °F (36.7 °C) (05/02/22 1100)  Pulse: 89 (05/02/22 1100)  Resp: 18 (05/02/22 1210)  BP: 105/66 (05/02/22 1100)  SpO2: 100 % (05/02/22 1100) Vital Signs (24h Range):  Temp:  [97.9 °F (36.6 °C)-100 °F (37.8 °C)] 98.1 °F (36.7 °C)  Pulse:  [] 89  Resp:  [18-30] 18  SpO2:  [93 %-100 %] 100 %  BP: (105-162)/(66-94) 105/66     Weight: 131.1 kg (289 lb 0.4 oz)  Body mass index is 45.27 kg/m².    Intake/Output Summary (Last 24 hours) at 5/2/2022 1548  Last data filed at 5/2/2022 0525  Gross per 24 hour   Intake 800 ml   Output 2450 ml   Net -1650 ml      Physical Exam  GEN:  WNWD, Morbidly obese BF,  non-toxic, appears comfortable  HEENT:  NCAT, MMM, EOMI, conjunctivae clear, anicteric sclerae, neck supple, no thyromegaly  CV:  RRR, no MRG, no carotid bruits, pulses palpable and equal bilat  CHEST:  Non-tender to palpation, no CVA tenderness, nml excursions, non-labored, equal air entry bilat, CTAB, no WRR  ABD:  BS+, NTND, no rebound/guarding, no ascites  EXTR:  no deformities, MAEW, strength equal bilat  SKIN:  no rashes, no CCE, warm, dry, turgor nml, Right buttock bandaged  NEURO:  A&Ox4, no focal deficits, CN II-XII intact grossly, reflexes nml  PSYCH:  calm, cooperative, responds appropriately, good insight, good judgement, nml fund of knowledge      Significant Labs: All pertinent labs within the past 24 hours have been reviewed.  Recent Lab Results  (Last 5 results in the past 24 hours)        05/02/22  1219   05/02/22  0500   05/02/22  0458   05/01/22  1956   05/01/22  1732        Abs Lymph     1.98           Abs Neutro     20.2           Anion Gap     9.0           Bands     2           Baso #     0.02           Basophil %     0.1           BUN     11.0           BUN/Creatinine Ratio     12           Calcium     7.9           Chloride     108           CO2     22           Creatinine     0.93           eGFR if non      >60           Eos #     0.01           Eosinophil %     0.0           Glucose     312           Hematocrit     26.0           Hemoglobin     8.3           Immature Grans (Abs)     0.58           Immature Granulocytes     2.4           Lymph #     1,936           Lymph Auto     8.2           Lymph Man     8           MCH     26.9           MCHC     31.9           MCV     84.1           Mono #     1.44                1,210           Mono %     6.0                5           MPV     9.8           Neutrophils Relative     83.3                85           Platelet Estimate     Adequate           Platelets     335           POC Glucose   323             POCT Glucose 427        240         Potassium     4.6           Preg, Serum         Negative       RBC     3.09           RBC Morph     Normal           RDW     15.0           Sodium     139           WBC     24.2                                  Significant Imaging: I have reviewed all pertinent imaging results/findings within the past 24 hours.      Assessment/Plan:      * Perianal abscess  - s/p I&D large abscess  - IV Vanc/Zosyn  - f/u cx  - wound care  - analgesia  - referred to LTAC      Anemia, chronic disease  - monitor      Uncontrolled type 2 diabetes mellitus with hyperglycemia  - SSI      Newly diagnosed diabetes  - SSI      SIRS (systemic inflammatory response syndrome)  - IVF  - abx  - monitor  - am labs        VTE Risk Mitigation (From admission, onward)         Ordered     Place sequential compression device  Until discontinued         05/01/22 0226     IP VTE LOW RISK PATIENT  Once         05/01/22 0226                Discharge Planning   AMAURY:      Code Status: Full Code   Is the patient medically ready for discharge?:     Reason for patient still in hospital (select all that apply): Treatment                     Shaun Sahu MD  Department of Hospital Medicine   [facility]

## 2022-05-03 PROBLEM — E66.01 MORBIDLY OBESE: Status: ACTIVE | Noted: 2022-05-03

## 2022-05-03 LAB
ANION GAP SERPL CALC-SCNC: 5 MEQ/L
BASOPHILS # BLD AUTO: 0.05 X10(3)/MCL (ref 0–0.2)
BASOPHILS NFR BLD AUTO: 0.3 %
BUN SERPL-MCNC: 8 MG/DL (ref 7–18.7)
CALCIUM SERPL-MCNC: 8.4 MG/DL (ref 8.4–10.2)
CHLORIDE SERPL-SCNC: 106 MMOL/L (ref 98–107)
CO2 SERPL-SCNC: 24 MMOL/L (ref 22–29)
CREAT SERPL-MCNC: 0.77 MG/DL (ref 0.55–1.02)
CREAT/UREA NIT SERPL: 10
EOSINOPHIL # BLD AUTO: 0.14 X10(3)/MCL (ref 0–0.9)
EOSINOPHIL NFR BLD AUTO: 0.7 %
ERYTHROCYTE [DISTWIDTH] IN BLOOD BY AUTOMATED COUNT: 14.8 % (ref 11.5–17)
GLUCOSE SERPL-MCNC: 269 MG/DL (ref 74–100)
GLUCOSE SERPL-MCNC: 294 MG/DL (ref 70–110)
HCT VFR BLD AUTO: 23.8 % (ref 37–47)
HGB BLD-MCNC: 7.8 GM/DL (ref 12–16)
IMM GRANULOCYTES # BLD AUTO: 0.86 X10(3)/MCL (ref 0–0.02)
IMM GRANULOCYTES NFR BLD AUTO: 4.3 % (ref 0–0.43)
LYMPHOCYTES # BLD AUTO: 3.04 X10(3)/MCL (ref 0.6–4.6)
LYMPHOCYTES NFR BLD AUTO: 15.3 %
MCH RBC QN AUTO: 27 PG (ref 27–31)
MCHC RBC AUTO-ENTMCNC: 32.8 MG/DL (ref 33–36)
MCV RBC AUTO: 82.4 FL (ref 80–94)
MONOCYTES # BLD AUTO: 1.66 X10(3)/MCL (ref 0.1–1.3)
MONOCYTES NFR BLD AUTO: 8.3 %
NEUTROPHILS # BLD AUTO: 14.1 X10(3)/MCL (ref 2.1–9.2)
NEUTROPHILS NFR BLD AUTO: 71.1 %
PLATELET # BLD AUTO: 351 X10(3)/MCL (ref 130–400)
PMV BLD AUTO: 10.3 FL (ref 9.4–12.4)
POCT GLUCOSE: 383 MG/DL (ref 70–110)
POTASSIUM SERPL-SCNC: 3.7 MMOL/L (ref 3.5–5.1)
RBC # BLD AUTO: 2.89 X10(6)/MCL (ref 4.2–5.4)
SODIUM SERPL-SCNC: 135 MMOL/L (ref 136–145)
VANCOMYCIN TROUGH SERPL-MCNC: 9 UG/ML (ref 15–20)
WBC # SPEC AUTO: 19.9 X10(3)/MCL (ref 4.5–11.5)

## 2022-05-03 PROCEDURE — 63600175 PHARM REV CODE 636 W HCPCS: Performed by: INTERNAL MEDICINE

## 2022-05-03 PROCEDURE — 97530 THERAPEUTIC ACTIVITIES: CPT

## 2022-05-03 PROCEDURE — 25000003 PHARM REV CODE 250: Performed by: SURGERY

## 2022-05-03 PROCEDURE — 80048 BASIC METABOLIC PNL TOTAL CA: CPT | Performed by: INTERNAL MEDICINE

## 2022-05-03 PROCEDURE — 85025 COMPLETE CBC W/AUTO DIFF WBC: CPT | Performed by: INTERNAL MEDICINE

## 2022-05-03 PROCEDURE — 97161 PT EVAL LOW COMPLEX 20 MIN: CPT

## 2022-05-03 PROCEDURE — 63600175 PHARM REV CODE 636 W HCPCS: Performed by: STUDENT IN AN ORGANIZED HEALTH CARE EDUCATION/TRAINING PROGRAM

## 2022-05-03 PROCEDURE — 80202 ASSAY OF VANCOMYCIN: CPT | Performed by: INTERNAL MEDICINE

## 2022-05-03 PROCEDURE — C1751 CATH, INF, PER/CENT/MIDLINE: HCPCS

## 2022-05-03 PROCEDURE — 63600175 PHARM REV CODE 636 W HCPCS: Performed by: SURGERY

## 2022-05-03 PROCEDURE — 25000003 PHARM REV CODE 250: Performed by: STUDENT IN AN ORGANIZED HEALTH CARE EDUCATION/TRAINING PROGRAM

## 2022-05-03 PROCEDURE — 36415 COLL VENOUS BLD VENIPUNCTURE: CPT | Performed by: INTERNAL MEDICINE

## 2022-05-03 PROCEDURE — 25000003 PHARM REV CODE 250: Performed by: INTERNAL MEDICINE

## 2022-05-03 PROCEDURE — 11000001 HC ACUTE MED/SURG PRIVATE ROOM

## 2022-05-03 PROCEDURE — 36569 INSJ PICC 5 YR+ W/O IMAGING: CPT

## 2022-05-03 PROCEDURE — 87205 SMEAR GRAM STAIN: CPT | Performed by: INTERNAL MEDICINE

## 2022-05-03 PROCEDURE — 94761 N-INVAS EAR/PLS OXIMETRY MLT: CPT

## 2022-05-03 PROCEDURE — C9399 UNCLASSIFIED DRUGS OR BIOLOG: HCPCS | Performed by: STUDENT IN AN ORGANIZED HEALTH CARE EDUCATION/TRAINING PROGRAM

## 2022-05-03 RX ORDER — SODIUM CHLORIDE 0.9 % (FLUSH) 0.9 %
10 SYRINGE (ML) INJECTION
Status: DISCONTINUED | OUTPATIENT
Start: 2022-05-03 | End: 2022-05-05 | Stop reason: HOSPADM

## 2022-05-03 RX ORDER — CYANOCOBALAMIN 1000 UG/ML
1000 INJECTION, SOLUTION INTRAMUSCULAR; SUBCUTANEOUS ONCE
Status: COMPLETED | OUTPATIENT
Start: 2022-05-03 | End: 2022-05-03

## 2022-05-03 RX ORDER — SODIUM FERRIC GLUCONATE COMPLEX IN SUCROSE 12.5 MG/ML
INJECTION INTRAVENOUS
Status: DISPENSED
Start: 2022-05-03 | End: 2022-05-04

## 2022-05-03 RX ORDER — HYDROCODONE BITARTRATE AND ACETAMINOPHEN 5; 325 MG/1; MG/1
TABLET ORAL
Status: DISPENSED
Start: 2022-05-03 | End: 2022-05-03

## 2022-05-03 RX ORDER — SODIUM CHLORIDE 0.9 % (FLUSH) 0.9 %
10 SYRINGE (ML) INJECTION EVERY 6 HOURS
Status: DISCONTINUED | OUTPATIENT
Start: 2022-05-03 | End: 2022-05-05 | Stop reason: HOSPADM

## 2022-05-03 RX ADMIN — MUPIROCIN: 20 OINTMENT TOPICAL at 09:05

## 2022-05-03 RX ADMIN — HYDROMORPHONE HYDROCHLORIDE 0.2 MG: 2 INJECTION INTRAMUSCULAR; INTRAVENOUS; SUBCUTANEOUS at 03:05

## 2022-05-03 RX ADMIN — SODIUM CHLORIDE 125 MG: 9 INJECTION, SOLUTION INTRAVENOUS at 08:05

## 2022-05-03 RX ADMIN — INSULIN ASPART 6 UNITS: 100 INJECTION, SOLUTION INTRAVENOUS; SUBCUTANEOUS at 05:05

## 2022-05-03 RX ADMIN — VANCOMYCIN HYDROCHLORIDE 1500 MG: 1 INJECTION, POWDER, LYOPHILIZED, FOR SOLUTION INTRAVENOUS at 06:05

## 2022-05-03 RX ADMIN — HYDROMORPHONE HYDROCHLORIDE 0.2 MG: 2 INJECTION INTRAMUSCULAR; INTRAVENOUS; SUBCUTANEOUS at 10:05

## 2022-05-03 RX ADMIN — TIZANIDINE 4 MG: 4 TABLET ORAL at 10:05

## 2022-05-03 RX ADMIN — CYANOCOBALAMIN 1000 MCG: 1000 INJECTION, SOLUTION INTRAMUSCULAR at 10:05

## 2022-05-03 RX ADMIN — VANCOMYCIN HYDROCHLORIDE 2000 MG: 1 INJECTION, POWDER, LYOPHILIZED, FOR SOLUTION INTRAVENOUS at 10:05

## 2022-05-03 RX ADMIN — Medication 6 MG: at 10:05

## 2022-05-03 RX ADMIN — MUPIROCIN: 20 OINTMENT TOPICAL at 11:05

## 2022-05-03 RX ADMIN — PIPERACILLIN SODIUM AND TAZOBACTAM SODIUM 4.5 G: 4; 500 INJECTION, POWDER, FOR SOLUTION INTRAVENOUS at 03:05

## 2022-05-03 RX ADMIN — ONDANSETRON 4 MG: 2 INJECTION INTRAMUSCULAR; INTRAVENOUS at 10:05

## 2022-05-03 RX ADMIN — KETOROLAC TROMETHAMINE 30 MG: 30 INJECTION, SOLUTION INTRAMUSCULAR; INTRAVENOUS at 12:05

## 2022-05-03 RX ADMIN — HYDROCODONE BITARTRATE AND ACETAMINOPHEN 1 TABLET: 5; 325 TABLET ORAL at 06:05

## 2022-05-03 RX ADMIN — INSULIN ASPART 3 UNITS: 100 INJECTION, SOLUTION INTRAVENOUS; SUBCUTANEOUS at 10:05

## 2022-05-03 RX ADMIN — HYDROCODONE BITARTRATE AND ACETAMINOPHEN 1 TABLET: 5; 325 TABLET ORAL at 05:05

## 2022-05-03 RX ADMIN — INSULIN DETEMIR 20 UNITS: 100 INJECTION, SOLUTION SUBCUTANEOUS at 09:05

## 2022-05-03 RX ADMIN — SENNOSIDES, DOCUSATE SODIUM 1 TABLET: 8.6; 5 TABLET ORAL at 09:05

## 2022-05-03 RX ADMIN — VANCOMYCIN HYDROCHLORIDE 1500 MG: 1 INJECTION, POWDER, LYOPHILIZED, FOR SOLUTION INTRAVENOUS at 09:05

## 2022-05-03 RX ADMIN — SENNOSIDES, DOCUSATE SODIUM 1 TABLET: 8.6; 5 TABLET ORAL at 10:05

## 2022-05-03 RX ADMIN — INSULIN ASPART 10 UNITS: 100 INJECTION, SOLUTION INTRAVENOUS; SUBCUTANEOUS at 11:05

## 2022-05-03 RX ADMIN — TIZANIDINE 4 MG: 4 TABLET ORAL at 09:05

## 2022-05-03 NOTE — PLAN OF CARE
Problem: Adult Inpatient Plan of Care  Goal: Plan of Care Review  Outcome: Ongoing, Progressing  Goal: Patient-Specific Goal (Individualized)  Outcome: Ongoing, Progressing  Goal: Absence of Hospital-Acquired Illness or Injury  Outcome: Ongoing, Progressing  Goal: Optimal Comfort and Wellbeing  Outcome: Ongoing, Progressing  Goal: Readiness for Transition of Care  Outcome: Ongoing, Progressing     Problem: Bariatric Environmental Safety  Goal: Safety Maintained with Care  Outcome: Ongoing, Progressing     Problem: Impaired Wound Healing  Goal: Optimal Wound Healing  Outcome: Ongoing, Progressing     Problem: Pain Acute  Goal: Acceptable Pain Control and Functional Ability  Outcome: Ongoing, Progressing     Problem: Infection  Goal: Absence of Infection Signs and Symptoms  Outcome: Ongoing, Progressing     Problem: Skin Injury Risk Increased  Goal: Skin Health and Integrity  Outcome: Ongoing, Progressing     Problem: Diabetes Comorbidity  Goal: Blood Glucose Level Within Targeted Range  Outcome: Ongoing, Progressing     Problem: Fall Injury Risk  Goal: Absence of Fall and Fall-Related Injury  Outcome: Ongoing, Progressing  Dressing change this shift, pain management, up to bsc with assist

## 2022-05-03 NOTE — NURSING
Pt lost IV access during afternoon of shift. PICC Line placed as per order. Awaiting XRAY confirmation before infusing ABX that are due. Zosyn and NS flush with have to be re-timed for next shift due to the delay. Pharmacy was notified that they will have to reschedule VANC labs due to AM dosing re-timing.

## 2022-05-03 NOTE — PT/OT/SLP PROGRESS
Physical Therapy Treatment    Patient Name:  Shell Rolon   MRN:  [id number]    Recommendations:     Discharge Recommendations:  home with home health   Discharge Equipment Recommendations: none   Barriers to discharge: None    Assessment:     Shell Rolon is a 41 y.o. female admitted with a medical diagnosis of Perianal abscess.  She presents with the following impairments/functional limitations:  pain, impaired balance, gait instability .Good participation, improved tolerance with mobility and was able to ambulate in hallway. Encouraged pt to increase mobility throughout the day.    Rehab Prognosis: Good; patient would benefit from acute skilled PT services to address these deficits and reach maximum level of function.    Recent Surgery: Procedure(s) (LRB):  DEBRIDEMENT, WOUND (N/A) 2 Days Post-Op    Plan:     During this hospitalization, patient to be seen daily to address the identified rehab impairments via therapeutic activities, gait training and progress toward the following goals:    · Plan of Care Expires:  05/30/22    Subjective     Chief Complaint: pain  Patient/Family Comments/goals: ambulate I'ly  Pain/Comfort:  · Pain Rating 1: 6/10  · Location 1: perirectal  · Pain Addressed 1: Reposition, Pre-medicate for activity  · Pain Rating Post-Intervention 1: 6/10      Objective:     Communicated with patient prior to session.  Patient found up in chair with   upon PT entry to room.     General Precautions: Standard, fall   Orthopedic Precautions:    Braces:    Respiratory Status: Room air     Functional Mobility:  · Bed Mobility:     · Supine to Sit: independence   · Gait: SBA pushing IV pole x 200'      AM-PAC 6 CLICK MOBILITY          Therapeutic Activities and Exercises:  Pt able to get OOB and to standing without assistance. She requires SBA with gait due to occasional unsteadiness but had no LOB. She tolerated gait over 200'.    Patient left up in chair with all lines intact..    GOALS:   Multidisciplinary  Problems     Physical Therapy Goals        Problem: Physical Therapy    Goal Priority Disciplines Outcome Goal Variances Interventions   Physical Therapy Goal     PT, PT/OT Ongoing, Progressing     Description: Goals to be met by: 22     Patient will increase functional independence with mobility by performin. Supine to sit with [place]  2. Sit to stand transfer with [place]  3. Gait  x 400 feet with [place] using No Assistive Device.                      Time Tracking:     PT Received On: 22  PT Start Time: 925     PT Stop Time: 940  PT Total Time (min): 15 min     Billable Minutes: Therapeutic Activity 20    Treatment Type: Treatment  PT/PTA: PTA           2022

## 2022-05-03 NOTE — PT/OT/SLP EVAL
Physical Therapy Evaluation    Patient Name:  Shell Rooln   MRN:  [id number]    Recommendations:     Discharge Recommendations:  home   Discharge Equipment Recommendations: none   Barriers to discharge: None    Assessment:     Shell Rolon is a 41 y.o. female admitted with a medical diagnosis of Perianal abscess.  She presents with the following impairments/functional limitations:  gait instability, impaired balance, pain.    Rehab Prognosis: Good; patient would benefit from acute skilled PT services to address these deficits and reach maximum level of function.    Recent Surgery: Procedure(s) (LRB):  DEBRIDEMENT, WOUND (N/A) 2 Days Post-Op    Plan:     During this hospitalization, patient to be seen daily to address the identified rehab impairments via gait training, therapeutic activities, therapeutic exercises and progress toward the following goals:    · Plan of Care Expires:  05/30/22    Subjective     Chief Complaint: pain  Patient/Family Comments/goals: decrease pain with mobility  Pain/Comfort:  ·      Patients cultural, spiritual, Orthodox conflicts given the current situation:      Living Environment:  home  Prior to admission, patients level of function was independent.  Equipment used at home:  .  DME owned (not currently used): none.  Upon discharge, patient will have assistance from family.    Objective:     Communicated with nurse Gonzalez prior to session.  Patient found right sidelying with peripheral IV  upon PT entry to room.    General Precautions: Standard, fall   Orthopedic Precautions:    Braces:    Respiratory Status: Room air    Exams:  · RLE Strength: WNL  · LLE Strength: WNL    Functional Mobility:  · Bed Mobility:     · Supine to Sit: minimum assistance  · Sit to Supine: minimum assistance  · Transfers:     · Sit to Stand:  contact guard assistance with no AD  · Gait: 100 ft within room pushing IV pole with CGA  · Balance: Standing balance fair    Therapeutic Activities and Exercises:    Standing and seated LLE stretches for sciatic nerve pain    AM-PAC 6 CLICK MOBILITY  Total Score:      Patient left supine with call button in reach and nurse present.    GOALS:   Multidisciplinary Problems     Physical Therapy Goals        Problem: Physical Therapy    Goal Priority Disciplines Outcome Goal Variances Interventions   Physical Therapy Goal     PT, PT/OT Ongoing, Progressing     Description: Goals to be met by: 22     Patient will increase functional independence with mobility by performin. Supine to sit with [place]  2. Sit to stand transfer with [place]  3. Gait  x 400 feet with [place] using No Assistive Device.                      History:     Past Medical History:   Diagnosis Date    Diabetes mellitus     Hypertension        Past Surgical History:   Procedure Laterality Date     SECTION      x3    JOINT REPLACEMENT Left     left forefinger has screw    TUBAL LIGATION         Time Tracking:     PT Received On: 22  PT Start Time: 1100     PT Stop Time: 1120  PT Total Time (min): 20 min     Billable Minutes: Evaluation 20 minutes      2022

## 2022-05-04 LAB
ALBUMIN SERPL-MCNC: 1.8 GM/DL (ref 3.5–5)
ALBUMIN/GLOB SERPL: 0.5 RATIO (ref 1.1–2)
ALP SERPL-CCNC: 104 UNIT/L (ref 40–150)
ALT SERPL-CCNC: 13 UNIT/L (ref 0–55)
AST SERPL-CCNC: 15 UNIT/L (ref 5–34)
BASOPHILS # BLD AUTO: 0.04 X10(3)/MCL (ref 0–0.2)
BASOPHILS NFR BLD AUTO: 0.3 %
BILIRUBIN DIRECT+TOT PNL SERPL-MCNC: 0.1 MG/DL (ref 0–0.8)
BILIRUBIN DIRECT+TOT PNL SERPL-MCNC: 0.4 MG/DL (ref 0–0.5)
BILIRUBIN DIRECT+TOT PNL SERPL-MCNC: 0.5 MG/DL
BUN SERPL-MCNC: 5 MG/DL (ref 7–18.7)
CALCIUM SERPL-MCNC: 8.3 MG/DL (ref 8.4–10.2)
CHLORIDE SERPL-SCNC: 105 MMOL/L (ref 98–107)
CO2 SERPL-SCNC: 26 MMOL/L (ref 22–29)
CREAT SERPL-MCNC: 0.71 MG/DL (ref 0.55–1.02)
EOSINOPHIL # BLD AUTO: 0.1 X10(3)/MCL (ref 0–0.9)
EOSINOPHIL NFR BLD AUTO: 0.8 %
ERYTHROCYTE [DISTWIDTH] IN BLOOD BY AUTOMATED COUNT: 15 % (ref 11.5–17)
FERRITIN SERPL-MCNC: 167.65 NG/ML (ref 4.63–204)
GLOBULIN SER-MCNC: 3.9 GM/DL (ref 2.4–3.5)
GLUCOSE SERPL-MCNC: 281 MG/DL (ref 74–100)
GRAM STN SPEC: ABNORMAL
GRAM STN SPEC: ABNORMAL
HCT VFR BLD AUTO: 22.1 % (ref 37–47)
HGB BLD-MCNC: 7.2 GM/DL (ref 12–16)
IMM GRANULOCYTES # BLD AUTO: 0.41 X10(3)/MCL (ref 0–0.02)
IMM GRANULOCYTES NFR BLD AUTO: 3.1 % (ref 0–0.43)
LYMPHOCYTES # BLD AUTO: 2.81 X10(3)/MCL (ref 0.6–4.6)
LYMPHOCYTES NFR BLD AUTO: 21.2 %
MCH RBC QN AUTO: 27.6 PG (ref 27–31)
MCHC RBC AUTO-ENTMCNC: 32.6 MG/DL (ref 33–36)
MCV RBC AUTO: 84.7 FL (ref 80–94)
MONOCYTES # BLD AUTO: 1.38 X10(3)/MCL (ref 0.1–1.3)
MONOCYTES NFR BLD AUTO: 10.4 %
NEUTROPHILS # BLD AUTO: 8.5 X10(3)/MCL (ref 2.1–9.2)
NEUTROPHILS NFR BLD AUTO: 64.2 %
PLATELET # BLD AUTO: 323 X10(3)/MCL (ref 130–400)
PMV BLD AUTO: 10.4 FL (ref 9.4–12.4)
POCT GLUCOSE: 221 MG/DL (ref 70–110)
POCT GLUCOSE: 293 MG/DL (ref 70–110)
POCT GLUCOSE: 294 MG/DL (ref 70–110)
POCT GLUCOSE: 296 MG/DL (ref 70–110)
POCT GLUCOSE: 316 MG/DL (ref 70–110)
POCT GLUCOSE: 339 MG/DL (ref 70–110)
POCT GLUCOSE: 358 MG/DL (ref 70–110)
POTASSIUM SERPL-SCNC: 3.6 MMOL/L (ref 3.5–5.1)
PROT SERPL-MCNC: 5.7 GM/DL (ref 6.4–8.3)
RBC # BLD AUTO: 2.61 X10(6)/MCL (ref 4.2–5.4)
SARS-COV-2 RDRP RESP QL NAA+PROBE: NEGATIVE
SODIUM SERPL-SCNC: 136 MMOL/L (ref 136–145)
WBC # SPEC AUTO: 13.2 X10(3)/MCL (ref 4.5–11.5)

## 2022-05-04 PROCEDURE — 87635 SARS-COV-2 COVID-19 AMP PRB: CPT | Performed by: INTERNAL MEDICINE

## 2022-05-04 PROCEDURE — 63600175 PHARM REV CODE 636 W HCPCS: Performed by: SURGERY

## 2022-05-04 PROCEDURE — 25000003 PHARM REV CODE 250: Performed by: SURGERY

## 2022-05-04 PROCEDURE — 25000003 PHARM REV CODE 250: Performed by: INTERNAL MEDICINE

## 2022-05-04 PROCEDURE — C1751 CATH, INF, PER/CENT/MIDLINE: HCPCS

## 2022-05-04 PROCEDURE — 86140 C-REACTIVE PROTEIN: CPT | Performed by: EMERGENCY MEDICINE

## 2022-05-04 PROCEDURE — 25000003 PHARM REV CODE 250: Performed by: EMERGENCY MEDICINE

## 2022-05-04 PROCEDURE — 97116 GAIT TRAINING THERAPY: CPT

## 2022-05-04 PROCEDURE — 63600175 PHARM REV CODE 636 W HCPCS: Performed by: INTERNAL MEDICINE

## 2022-05-04 PROCEDURE — 85025 COMPLETE CBC W/AUTO DIFF WBC: CPT | Performed by: EMERGENCY MEDICINE

## 2022-05-04 PROCEDURE — C9399 UNCLASSIFIED DRUGS OR BIOLOG: HCPCS | Performed by: STUDENT IN AN ORGANIZED HEALTH CARE EDUCATION/TRAINING PROGRAM

## 2022-05-04 PROCEDURE — 82728 ASSAY OF FERRITIN: CPT | Performed by: EMERGENCY MEDICINE

## 2022-05-04 PROCEDURE — 36415 COLL VENOUS BLD VENIPUNCTURE: CPT | Performed by: EMERGENCY MEDICINE

## 2022-05-04 PROCEDURE — A4216 STERILE WATER/SALINE, 10 ML: HCPCS | Performed by: EMERGENCY MEDICINE

## 2022-05-04 PROCEDURE — 25000003 PHARM REV CODE 250: Performed by: STUDENT IN AN ORGANIZED HEALTH CARE EDUCATION/TRAINING PROGRAM

## 2022-05-04 PROCEDURE — 63600175 PHARM REV CODE 636 W HCPCS: Performed by: STUDENT IN AN ORGANIZED HEALTH CARE EDUCATION/TRAINING PROGRAM

## 2022-05-04 PROCEDURE — 11000001 HC ACUTE MED/SURG PRIVATE ROOM

## 2022-05-04 PROCEDURE — 80053 COMPREHEN METABOLIC PANEL: CPT | Performed by: EMERGENCY MEDICINE

## 2022-05-04 RX ADMIN — SENNOSIDES, DOCUSATE SODIUM 1 TABLET: 8.6; 5 TABLET ORAL at 09:05

## 2022-05-04 RX ADMIN — HYDROCODONE BITARTRATE AND ACETAMINOPHEN 1 TABLET: 5; 325 TABLET ORAL at 01:05

## 2022-05-04 RX ADMIN — HYDROCODONE BITARTRATE AND ACETAMINOPHEN 1 TABLET: 5; 325 TABLET ORAL at 09:05

## 2022-05-04 RX ADMIN — SODIUM CHLORIDE, PRESERVATIVE FREE 10 ML: 5 INJECTION INTRAVENOUS at 12:05

## 2022-05-04 RX ADMIN — VANCOMYCIN HYDROCHLORIDE 2000 MG: 1 INJECTION, POWDER, LYOPHILIZED, FOR SOLUTION INTRAVENOUS at 10:05

## 2022-05-04 RX ADMIN — INSULIN DETEMIR 20 UNITS: 100 INJECTION, SOLUTION SUBCUTANEOUS at 08:05

## 2022-05-04 RX ADMIN — SODIUM CHLORIDE, PRESERVATIVE FREE 10 ML: 5 INJECTION INTRAVENOUS at 06:05

## 2022-05-04 RX ADMIN — TIZANIDINE 4 MG: 4 TABLET ORAL at 06:05

## 2022-05-04 RX ADMIN — HYDROMORPHONE HYDROCHLORIDE 0.2 MG: 2 INJECTION INTRAMUSCULAR; INTRAVENOUS; SUBCUTANEOUS at 11:05

## 2022-05-04 RX ADMIN — ONDANSETRON 4 MG: 2 INJECTION INTRAMUSCULAR; INTRAVENOUS at 07:05

## 2022-05-04 RX ADMIN — INSULIN ASPART 4 UNITS: 100 INJECTION, SOLUTION INTRAVENOUS; SUBCUTANEOUS at 05:05

## 2022-05-04 RX ADMIN — TIZANIDINE 4 MG: 4 TABLET ORAL at 07:05

## 2022-05-04 RX ADMIN — MUPIROCIN: 20 OINTMENT TOPICAL at 08:05

## 2022-05-04 RX ADMIN — INSULIN ASPART 8 UNITS: 100 INJECTION, SOLUTION INTRAVENOUS; SUBCUTANEOUS at 11:05

## 2022-05-04 RX ADMIN — PIPERACILLIN SODIUM AND TAZOBACTAM SODIUM 4.5 G: 4; 500 INJECTION, POWDER, FOR SOLUTION INTRAVENOUS at 01:05

## 2022-05-04 RX ADMIN — HYDROCODONE BITARTRATE AND ACETAMINOPHEN 1 TABLET: 5; 325 TABLET ORAL at 05:05

## 2022-05-04 RX ADMIN — SODIUM CHLORIDE, PRESERVATIVE FREE 10 ML: 5 INJECTION INTRAVENOUS at 11:05

## 2022-05-04 RX ADMIN — VANCOMYCIN HYDROCHLORIDE 2000 MG: 1 INJECTION, POWDER, LYOPHILIZED, FOR SOLUTION INTRAVENOUS at 09:05

## 2022-05-04 RX ADMIN — Medication 6 MG: at 09:05

## 2022-05-04 RX ADMIN — SODIUM CHLORIDE, PRESERVATIVE FREE 10 ML: 5 INJECTION INTRAVENOUS at 05:05

## 2022-05-04 RX ADMIN — INSULIN ASPART 4 UNITS: 100 INJECTION, SOLUTION INTRAVENOUS; SUBCUTANEOUS at 10:05

## 2022-05-04 RX ADMIN — SODIUM CHLORIDE, PRESERVATIVE FREE 10 ML: 5 INJECTION INTRAVENOUS at 01:05

## 2022-05-04 RX ADMIN — SENNOSIDES, DOCUSATE SODIUM 1 TABLET: 8.6; 5 TABLET ORAL at 08:05

## 2022-05-04 RX ADMIN — PIPERACILLIN SODIUM AND TAZOBACTAM SODIUM 4.5 G: 4; 500 INJECTION, POWDER, FOR SOLUTION INTRAVENOUS at 07:05

## 2022-05-04 RX ADMIN — HYDROMORPHONE HYDROCHLORIDE 0.2 MG: 2 INJECTION INTRAMUSCULAR; INTRAVENOUS; SUBCUTANEOUS at 07:05

## 2022-05-04 RX ADMIN — KETOROLAC TROMETHAMINE 30 MG: 30 INJECTION, SOLUTION INTRAMUSCULAR; INTRAVENOUS at 02:05

## 2022-05-04 RX ADMIN — PIPERACILLIN SODIUM AND TAZOBACTAM SODIUM 4.5 G: 4; 500 INJECTION, POWDER, FOR SOLUTION INTRAVENOUS at 02:05

## 2022-05-04 RX ADMIN — SODIUM CHLORIDE: 9 INJECTION, SOLUTION INTRAVENOUS at 10:05

## 2022-05-04 RX ADMIN — SODIUM CHLORIDE: 9 INJECTION, SOLUTION INTRAVENOUS at 05:05

## 2022-05-04 RX ADMIN — THIAMINE HYDROCHLORIDE 250 MG: 100 INJECTION, SOLUTION INTRAMUSCULAR; INTRAVENOUS at 09:05

## 2022-05-04 NOTE — ASSESSMENT & PLAN NOTE
- s/p I&D large abscess  - IV Vanc/Zosyn  - f/u cx  - wound care  - analgesia prn pain   - referred to LTAC pending  Cover for g negatives, g positives and anaerobes.

## 2022-05-04 NOTE — PROGRESS NOTES
Pharmacokinetic Assessment Follow Up: IV Vancomycin      Vancomycin Regimen Plan:    Continue regimen to Vancomycin 2000 mg IV every 12 hours with next serum trough concentration measured at 2000 prior to 4th dose on 5/4    Drug levels (last 3 results):  No results for input(s): VANCOMYCINRA, VANCOMYCINPE, VANCOMYCINTR, VANCOTROUGH in the last 72 hours.    Pharmacy will continue to follow and monitor vancomycin.    Please contact pharmacy at extension [phone] for questions regarding this assessment.    Thank you for the consult,   Christine Mauricio       Patient brief summary:  Shell Rolon is a 41 y.o. female initiated on antimicrobial therapy with IV Vancomycin for treatment of skin or soft tissue infection    Drug Allergies:   Review of patient's allergies indicates:  No Known Allergies    Actual Body Weight:   131.1    Renal Function:   Estimated Creatinine Clearance: 144.2 mL/min (based on SCr of 0.71 mg/dL).,     Dialysis Method (if applicable):  N/A    CBC (last 72 hours):  Recent Labs   Lab Result Units 05/02/22 0458 05/03/22 0422 05/04/22  0513   WBC x10(3)/mcL 24.2* 19.9* 13.2*   Hgb gm/dL 8.3* 7.8* 7.2*   Hct % 26.0* 23.8* 22.1*   Platelet x10(3)/mcL 335 351 323   Mono Auto % 6.0 8.3 10.4   Monocyte Man % 5  --   --    Eos Auto % 0.0 0.7 0.8   Basophil Auto % 0.1 0.3 0.3       Metabolic Panel (last 72 hours):  Recent Labs   Lab Result Units 05/02/22 0458 05/03/22 0422 05/04/22  0513   Sodium Level mmol/L 139 135* 136   Potassium Level mmol/L 4.6 3.7 3.6   Chloride mmol/L 108* 106 105   Carbon Dioxide mmol/L 22 24 26   Glucose Level mg/dL 312* 269* 281*   Blood Urea Nitrogen mg/dL 11.0 8.0 5.0*   Creatinine mg/dL 0.93 0.77 0.71   Albumin Level gm/dL  --   --  1.8*   Albumin/Globulin Ratio ratio  --   --  0.5*   Bilirubin Total mg/dL  --   --  0.5   Alkaline Phosphatase unit/L  --   --  104   Aspartate Aminotransferase unit/L  --   --  15   Alanine Aminotransferase unit/L  --   --  13       Vancomycin  Administrations:  vancomycin given in the last 96 hours                   vancomycin (VANCOCIN) 2,000 mg in dextrose 5 % 500 mL IVPB (mg) 2,000 mg New Bag 05/04/22 1037     2,000 mg New Bag 05/03/22 2239    vancomycin (VANCOCIN) 1,500 mg in dextrose 5 % 500 mL IVPB (mg) 1,500 mg New Bag 05/03/22 0906     1,500 mg New Bag  0614     1,500 mg New Bag 05/02/22 1732     1,500 mg New Bag  0519     1,500 mg New Bag 05/01/22 1529                Microbiologic Results:  Microbiology Results (last 7 days)     Procedure Component Value Units Date/Time    Gram Stain [facility] [[id number]]  (Abnormal) Collected: 05/03/22 1027    Order Status: Completed Specimen: Wound from Buttocks, Right Updated: 05/04/22 1026     GRAM STAIN Many WBC observed      Many Gram positive cocci    Fungal Culture [[id number]] Collected: 05/01/22 2048    Order Status: Sent Specimen: Drainage from Buttocks, Right Updated: 05/02/22 0023    Wound Culture [[id number]] Collected: 05/01/22 2048    Order Status: Resulted Specimen: Drainage from Buttocks, Right Updated: 05/02/22 0023    Anaerobic Culture [[id number]] Collected: 05/01/22 2048    Order Status: Sent Specimen: Drainage from Buttocks, Right Updated: 05/02/22 0023    Gram Stain [[id number]] Collected: 05/01/22 2048    Order Status: Canceled Specimen: Wound from Buttocks, Right Updated: 05/02/22 0023

## 2022-05-04 NOTE — NURSING
Noted during hospital rounding that cxr from picc had not been read for placement. Dr. Carreon rounding, viewed x-ray, and noted per this nurse flushed and draws. Received order ok to use picc for infusion.

## 2022-05-04 NOTE — PLAN OF CARE
Problem: Adult Inpatient Plan of Care  Goal: Plan of Care Review  Outcome: Ongoing, Progressing  Goal: Patient-Specific Goal (Individualized)  Outcome: Ongoing, Progressing  Goal: Absence of Hospital-Acquired Illness or Injury  Outcome: Ongoing, Progressing  Goal: Optimal Comfort and Wellbeing  Outcome: Ongoing, Progressing  Goal: Readiness for Transition of Care  Outcome: Ongoing, Progressing     Problem: Bariatric Environmental Safety  Goal: Safety Maintained with Care  Outcome: Ongoing, Progressing     Problem: Impaired Wound Healing  Goal: Optimal Wound Healing  Outcome: Ongoing, Progressing

## 2022-05-04 NOTE — ASSESSMENT & PLAN NOTE
- s/p I&D large abscess  - IV Vanc/Zosyn  - f/u cx  - wound care  - analgesia  - referred to LTAC pending  Cover for g negatives, g positives and anaerobes.

## 2022-05-04 NOTE — ASSESSMENT & PLAN NOTE
Body mass index is 45.27 kg/m². Morbid obesity complicates all aspects of disease management from diagnostic modalities to treatment. Weight loss encouraged and health benefits explained to patient.

## 2022-05-04 NOTE — PROGRESS NOTES
[facility]  [facility] Medicine  Progress Note    Patient Name: Shell Rolon  MRN: [id number]  Patient Class: IP- Inpatient   Admission Date: 5/1/2022  Length of Stay: 2 days  Attending Physician: Shaun Sahu MD  Primary Care Provider: Mackenzie Clay NP        Subjective:     Principal Problem:Perianal abscess        HPI:  42yo BF with h/o HTN, Morbid Obesity, new dx of DM2 admitted presented with Abscess of Left Buttock near Perirectal area worsening over 10 days. Given Bactrim by PCP on 4/25. Found to have very large abscess on exam 26.5cm x 6cm. CT A/P showed locules of gas in right ischiorectal fossa extending into right gluteal region suspicious for Ken's Gangrene. A1c 10.4 and Gluc 438. WBC 30.6 and . [facility] [place] new dx DM.      Overview/Hospital Course:  Pt placed on IV Vanc/Zosyn. Underwent I&D in OR 5/1 of large Right Perianal Abscess (L)15cm x (W)5cm x (D)7cm. Pt referred to LTAC for continuation of care.  05/03/2022  Patient doing much better.  Erectile pain, discomfort improved.  Finally able to have a bowel movement without any active bleeding.  Patient said that she has been a borderline diabetes but never treated until presented to emergency room  Will continue IV antibiotic, continue wound care and follow-up with surgery in  regarding disposition.  A PICC line was placed today, seen evaluated and okay to be used.      Interval History:  Today patient feels much better.  Denies any fever chills nausea vomiting diarrhea.  P.o. intake is stable and had a bowel movement  Perirectal area appears to be better and pain control.    Review of Systems   Constitutional: Negative.    HENT: Negative.     Respiratory: Negative.     Cardiovascular: Negative.    Gastrointestinal:  Positive for abdominal pain, constipation and rectal pain. Negative for nausea and vomiting.   Endocrine: Negative.    Genitourinary: Negative.    Neurological: Negative.     Psychiatric/Behavioral: Negative.     All other systems reviewed and are negative.  Objective:     Vital Signs (Most Recent):  Temp: 99.9 °F (37.7 °C) (05/03/22 1818)  Pulse: 100 (05/03/22 1818)  Resp: 18 (05/03/22 1818)  BP: 139/79 (05/03/22 1818)  SpO2: 100 % (05/03/22 1818) Vital Signs (24h Range):  Temp:  [98.4 °F (36.9 °C)-99.9 °F (37.7 °C)] 99.9 °F (37.7 °C)  Pulse:  [] 100  Resp:  [16-18] 18  SpO2:  [98 %-100 %] 100 %  BP: (131-140)/(68-84) 139/79     Weight: 131.1 kg (289 lb 0.4 oz)  Body mass index is 45.27 kg/m².    Intake/Output Summary (Last 24 hours) at 5/3/2022 2049  Last data filed at 5/3/2022 1300  Gross per 24 hour   Intake 1320 ml   Output 1050 ml   Net 270 ml      Physical Exam  Constitutional:       Appearance: Normal appearance. She is obese.   HENT:      Head: Normocephalic and atraumatic.      Right Ear: Tympanic membrane normal.      Left Ear: Tympanic membrane normal.      Nose: Nose normal.      Mouth/Throat:      Mouth: Mucous membranes are moist.      Pharynx: Oropharynx is clear.   Eyes:      Extraocular Movements: Extraocular movements intact.      Conjunctiva/sclera: Conjunctivae normal.      Pupils: Pupils are equal, round, and reactive to light.   Cardiovascular:      Rate and Rhythm: Normal rate and regular rhythm.      Pulses: Normal pulses.   Pulmonary:      Effort: Pulmonary effort is normal.      Breath sounds: Normal breath sounds.   Abdominal:      General: Abdomen is flat. Bowel sounds are normal.      Palpations: Abdomen is soft.      Comments: Unchanged.  Rectal area.  Status post surgical intervention   Musculoskeletal:         General: Normal range of motion.      Cervical back: Normal range of motion and neck supple.   Skin:     General: Skin is warm.      Capillary Refill: Capillary refill takes less than 2 seconds.   Neurological:      General: No focal deficit present.      Mental Status: She is alert and oriented to person, place, and time.   Psychiatric:          Mood and Affect: Mood normal.       Significant Labs: All pertinent labs within the past 24 hours have been reviewed.  BMP:   Recent Labs   Lab 05/03/22 0422   CO2 24   BUN 8.0   CREATININE 0.77   CALCIUM 8.4     CBC:   Recent Labs   Lab 05/02/22 0458 05/03/22 0422   WBC 24.2* 19.9*   HGB 8.3* 7.8*   HCT 26.0* 23.8*       Significant Imaging:  Chest x-ray pending      Assessment/Plan:      * Perianal abscess  - s/p I&D large abscess  - IV Vanc/Zosyn  - f/u cx  - wound care  - analgesia  - referred to LTAC pending  Cover for g negatives, g positives and anaerobes.      SIRS (systemic inflammatory response syndrome)  - IVF  - abx  - monitor  - am labs  Will check C-reactive protein      Newly diagnosed diabetes  - SSI  Recheck hemoglobin A1c.  Diabetic Education performed.  Next diabetic diet as well      Uncontrolled type 2 diabetes mellitus with hyperglycemia  - SSI      Morbidly obese  Body mass index is 45.27 kg/m². Morbid obesity complicates all aspects of disease management from diagnostic modalities to treatment. Weight loss encouraged and health benefits explained to patient.         Anemia, chronic disease  There is a mild drop in H&H.  Will obtain iron studies.  Check H&H.  Consider transfusing the patient is less than 7 hemoglobin versus asymptomatic.      VTE Risk Mitigation (From admission, onward)         Ordered     Place sequential compression device  Until discontinued         05/01/22 0226     IP VTE LOW RISK PATIENT  Once         05/01/22 0226                Discharge Planning   AMAURY:      Code Status: Full Code   Is the patient medically ready for discharge?: No    Reason for patient still in hospital (select all that apply): Consult recommendations  Discharge Plan A: Long-term acute care facility (LTAC)                  Shon Carreon MD  Department of Hospital Medicine   [facility]

## 2022-05-04 NOTE — PROGRESS NOTES
[facility]  Hospital Medicine  Progress Note    Patient Name: Shell Rolon  MRN: [id number]  Patient Class: IP- Inpatient   Admission Date: 5/1/2022  Length of Stay: 3 days  Attending Physician: Shaun Sahu MD  Primary Care Provider: Mackenzie Clay NP        Subjective:     Principal Problem:Perianal abscess        HPI:  42yo BF with h/o HTN, Morbid Obesity, new dx of DM2 admitted presented with Abscess of Left Buttock near Perirectal area worsening over 10 days. Given Bactrim by PCP on 4/25. Found to have very large abscess on exam 26.5cm x 6cm. CT A/P showed locules of gas in right ischiorectal fossa extending into right gluteal region suspicious for Ken's Gangrene. A1c 10.4 and Gluc 438. WBC 30.6 and . [facility] consulted for new dx DM.      Overview/Hospital Course:  Pt placed on IV Vanc/Zosyn. Underwent I&D in OR 5/1 of large Right Perianal Abscess (L)15cm x (W)5cm x (D)7cm. Pt referred to LTAC for continuation of care.  05/03/2022  Patient doing much better.  Erectile pain, discomfort improved.  Finally able to have a bowel movement without any active bleeding.  Patient said that she has been a borderline diabetes but never treated until presented to emergency room  Will continue IV antibiotic, continue wound care and follow-up with surgery in  regarding disposition.  A PICC line was placed today, seen evaluated and okay to be used.      Interval History:  Patient today complains of perirectal pain.  Pain is 5/10 continues.  More during the wound care.  She denies any  rectal bleeding.  History of irregular periods and heavy.  She has been anemic in the past but never had been worked up.  She wanted to have a hysterectomy in the past for the same matter.    Review of Systems   Constitutional: Negative.    HENT: Negative.     Respiratory: Negative.     Gastrointestinal:         See history of present illness   Genitourinary: Negative.    All other  systems reviewed and are negative.  Objective:     Vital Signs (Most Recent):  Temp: 98.4 °F (36.9 °C) (05/04/22 1146)  Pulse: 80 (05/04/22 1146)  Resp: 18 (05/04/22 1350)  BP: (!) 140/85 (05/04/22 1146)  SpO2: 99 % (05/04/22 1146)   Vital Signs (24h Range):  Temp:  [98.1 °F (36.7 °C)-99.9 °F (37.7 °C)] 98.4 °F (36.9 °C)  Pulse:  [] 80  Resp:  [18-20] 18  SpO2:  [96 %-100 %] 99 %  BP: ()/(64-85) 140/85     Weight: 131.1 kg (289 lb 0.4 oz)  Body mass index is 45.27 kg/m².    Intake/Output Summary (Last 24 hours) at 5/4/2022 1524  Last data filed at 5/4/2022 1327  Gross per 24 hour   Intake 900 ml   Output 800 ml   Net 100 ml      Physical Exam  Constitutional:       Appearance: Normal appearance. She is normal weight.   HENT:      Head: Normocephalic and atraumatic.      Right Ear: Tympanic membrane normal.      Left Ear: Tympanic membrane normal.      Nose: Nose normal.      Mouth/Throat:      Mouth: Mucous membranes are moist.      Pharynx: Oropharynx is clear.   Eyes:      Extraocular Movements: Extraocular movements intact.      Conjunctiva/sclera: Conjunctivae normal.      Pupils: Pupils are equal, round, and reactive to light.   Cardiovascular:      Rate and Rhythm: Normal rate and regular rhythm.      Pulses: Normal pulses.   Pulmonary:      Effort: Pulmonary effort is normal.      Breath sounds: Normal breath sounds.   Abdominal:      General: Abdomen is flat. Bowel sounds are normal.      Palpations: Abdomen is soft.      Comments: Perirectal area no discharge.  No active bleeding.  Has been dressed   Musculoskeletal:         General: Normal range of motion.      Cervical back: Normal range of motion and neck supple.   Skin:     General: Skin is warm.      Capillary Refill: Capillary refill takes less than 2 seconds.      Coloration: Skin is pale.   Neurological:      General: No focal deficit present.      Mental Status: She is alert and oriented to person, place, and time.   Psychiatric:          Mood and Affect: Mood normal.       Significant Labs:  Hb 7.2 today     Significant Imaging:       Assessment/Plan:      * Perianal abscess  - s/p I&D large abscess  - IV Vanc/Zosyn  - f/u cx  - wound care  - analgesia prn pain   - referred to LTAC pending  Cover for g negatives, g positives and anaerobes.      SIRS (systemic inflammatory response syndrome)  Improved at this point.      Newly diagnosed diabetes  - SSI  Recheck hemoglobin A1c.  Diabetic Education performed.  Next diabetic diet as well      Uncontrolled type 2 diabetes mellitus with hyperglycemia  - SSI  Change long acting insulin to 25 units qhs    Morbidly obese  Body mass index is 45.27 kg/m². Morbid obesity complicates all aspects of disease management from diagnostic modalities to treatment. Weight loss encouraged and health benefits explained to patient.         Anemia, chronic disease  There is a mild drop in H&H.  Will obtain iron studies.  Check H&H.  Consider transfusing the patient is less than 7 hemoglobin versus asymptomatic.      VTE Risk Mitigation (From admission, onward)         Ordered     Place sequential compression device  Until discontinued         05/01/22 0226     IP VTE LOW RISK PATIENT  Once         05/01/22 0226                Discharge Planning   AMAURY:      Code Status: Full Code   Is the patient medically ready for discharge?: No    Reason for patient still in hospital (select all that apply): Treatment needs to continue to LTAC   Discharge Plan A: Long-term acute care facility (LTAC)                  Shon Carreon MD  Department of Hospital Medicine   [facility]

## 2022-05-04 NOTE — SUBJECTIVE & OBJECTIVE
Interval History:  Today patient feels much better.  Denies any fever chills nausea vomiting diarrhea.  P.o. intake is stable and had a bowel movement  Perirectal area appears to be better and pain control.    Review of Systems   Constitutional: Negative.    HENT: Negative.     Respiratory: Negative.     Cardiovascular: Negative.    Gastrointestinal:  Positive for abdominal pain, constipation and rectal pain. Negative for nausea and vomiting.   Endocrine: Negative.    Genitourinary: Negative.    Neurological: Negative.    Psychiatric/Behavioral: Negative.     All other systems reviewed and are negative.  Objective:     Vital Signs (Most Recent):  Temp: 99.9 °F (37.7 °C) (05/03/22 1818)  Pulse: 100 (05/03/22 1818)  Resp: 18 (05/03/22 1818)  BP: 139/79 (05/03/22 1818)  SpO2: 100 % (05/03/22 1818) Vital Signs (24h Range):  Temp:  [98.4 °F (36.9 °C)-99.9 °F (37.7 °C)] 99.9 °F (37.7 °C)  Pulse:  [] 100  Resp:  [16-18] 18  SpO2:  [98 %-100 %] 100 %  BP: (131-140)/(68-84) 139/79     Weight: 131.1 kg (289 lb 0.4 oz)  Body mass index is 45.27 kg/m².    Intake/Output Summary (Last 24 hours) at 5/3/2022 2049  Last data filed at 5/3/2022 1300  Gross per 24 hour   Intake 1320 ml   Output 1050 ml   Net 270 ml      Physical Exam  Constitutional:       Appearance: Normal appearance. She is obese.   HENT:      Head: Normocephalic and atraumatic.      Right Ear: Tympanic membrane normal.      Left Ear: Tympanic membrane normal.      Nose: Nose normal.      Mouth/Throat:      Mouth: Mucous membranes are moist.      Pharynx: Oropharynx is clear.   Eyes:      Extraocular Movements: Extraocular movements intact.      Conjunctiva/sclera: Conjunctivae normal.      Pupils: Pupils are equal, round, and reactive to light.   Cardiovascular:      Rate and Rhythm: Normal rate and regular rhythm.      Pulses: Normal pulses.   Pulmonary:      Effort: Pulmonary effort is normal.      Breath sounds: Normal breath sounds.   Abdominal:       General: Abdomen is flat. Bowel sounds are normal.      Palpations: Abdomen is soft.      Comments: Unchanged.  Rectal area.  Status post surgical intervention   Musculoskeletal:         General: Normal range of motion.      Cervical back: Normal range of motion and neck supple.   Skin:     General: Skin is warm.      Capillary Refill: Capillary refill takes less than 2 seconds.   Neurological:      General: No focal deficit present.      Mental Status: She is alert and oriented to person, place, and time.   Psychiatric:         Mood and Affect: Mood normal.       Significant Labs: All pertinent labs within the past 24 hours have been reviewed.  BMP:   Recent Labs   Lab 05/03/22  0422   CO2 24   BUN 8.0   CREATININE 0.77   CALCIUM 8.4     CBC:   Recent Labs   Lab 05/02/22  0458 05/03/22  0422   WBC 24.2* 19.9*   HGB 8.3* 7.8*   HCT 26.0* 23.8*       Significant Imaging:  Chest x-ray pending

## 2022-05-04 NOTE — PT/OT/SLP PROGRESS
Physical Therapy Treatment    Patient Name:  Shell Rolon   MRN:  [id number]    Recommendations:     Discharge Recommendations:  home with home health   Discharge Equipment Recommendations: none   Barriers to discharge: None    Assessment:     Shell Rolon is a 41 y.o. female admitted with a medical diagnosis of Perianal abscess.  She presents with the following impairments/functional limitations:  pain, gait instability, impaired functional mobilty.    Rehab Prognosis: Good; patient would benefit from acute skilled PT services to address these deficits and reach maximum level of function.    Recent Surgery: Procedure(s) (LRB):  DEBRIDEMENT, WOUND (N/A) 3 Days Post-Op    Plan:     During this hospitalization, patient to be seen daily to address the identified rehab impairments via gait training, therapeutic activities, therapeutic exercises and progress toward the following goals:    · Plan of Care Expires:  05/30/22    Subjective     Chief Complaint: Pain.  Patient/Family Comments/goals: decrease pain.  Pain/Comfort:  · Pain Rating 1: 5/10  · Location - Side 1: Right  · Location 1: perirectal  · Pain Addressed 1: Reposition  · Pain Rating Post-Intervention 1: 5/10      Objective:     Communicated with nurse prior to session.  Patient found sitting edge of bed with peripheral IV upon PT entry to room.     General Precautions: Standard, fall   Orthopedic Precautions:    Braces:    Respiratory Status: Room air     Functional Mobility:  · Transfers:     · Sit to Stand:  supervision with no AD  · Gait: 150 ft pushing IV pole with supervision  · Balance: Standing fair +      AM-PAC 6 CLICK MOBILITY          Therapeutic Activities and Exercises:   Ambulation down hallway.    Patient left sitting edge of bed with call button in reach and spouse present..    GOALS:   Multidisciplinary Problems     Physical Therapy Goals        Problem: Physical Therapy    Goal Priority Disciplines Outcome Goal Variances Interventions   Physical  Therapy Goal     PT, PT/OT Ongoing, Progressing     Description: Goals to be met by: 22     Patient will increase functional independence with mobility by performin. Supine to sit with [place]  2. Sit to stand transfer with [place]  3. Gait  x 400 feet with [place] using No Assistive Device.                      Time Tracking:     PT Received On: 22  PT Start Time: 1000     PT Stop Time: 1020  PT Total Time (min): 20 min     Billable Minutes: Gait Training 20    Treatment Type: Treatment  PT/PTA: PT           2022

## 2022-05-04 NOTE — ASSESSMENT & PLAN NOTE
There is a mild drop in H&H.  Will obtain iron studies.  Check H&H.  Consider transfusing the patient is less than 7 hemoglobin versus asymptomatic.

## 2022-05-04 NOTE — SUBJECTIVE & OBJECTIVE
Interval History:  Patient today complains of perirectal pain.  Pain is 5/10 continues.  More during the wound care.  She denies any  rectal bleeding.  History of irregular periods and heavy.  She has been anemic in the past but never had been worked up.  She wanted to have a hysterectomy in the past for the same matter.    Review of Systems   Constitutional: Negative.    HENT: Negative.     Respiratory: Negative.     Gastrointestinal:         See history of present illness   Genitourinary: Negative.    All other systems reviewed and are negative.  Objective:     Vital Signs (Most Recent):  Temp: 98.4 °F (36.9 °C) (05/04/22 1146)  Pulse: 80 (05/04/22 1146)  Resp: 18 (05/04/22 1350)  BP: (!) 140/85 (05/04/22 1146)  SpO2: 99 % (05/04/22 1146)   Vital Signs (24h Range):  Temp:  [98.1 °F (36.7 °C)-99.9 °F (37.7 °C)] 98.4 °F (36.9 °C)  Pulse:  [] 80  Resp:  [18-20] 18  SpO2:  [96 %-100 %] 99 %  BP: ()/(64-85) 140/85     Weight: 131.1 kg (289 lb 0.4 oz)  Body mass index is 45.27 kg/m².    Intake/Output Summary (Last 24 hours) at 5/4/2022 1524  Last data filed at 5/4/2022 1327  Gross per 24 hour   Intake 900 ml   Output 800 ml   Net 100 ml      Physical Exam  Constitutional:       Appearance: Normal appearance. She is normal weight.   HENT:      Head: Normocephalic and atraumatic.      Right Ear: Tympanic membrane normal.      Left Ear: Tympanic membrane normal.      Nose: Nose normal.      Mouth/Throat:      Mouth: Mucous membranes are moist.      Pharynx: Oropharynx is clear.   Eyes:      Extraocular Movements: Extraocular movements intact.      Conjunctiva/sclera: Conjunctivae normal.      Pupils: Pupils are equal, round, and reactive to light.   Cardiovascular:      Rate and Rhythm: Normal rate and regular rhythm.      Pulses: Normal pulses.   Pulmonary:      Effort: Pulmonary effort is normal.      Breath sounds: Normal breath sounds.   Abdominal:      General: Abdomen is flat. Bowel sounds are normal.       Palpations: Abdomen is soft.      Comments: Perirectal area no discharge.  No active bleeding.  Has been dressed   Musculoskeletal:         General: Normal range of motion.      Cervical back: Normal range of motion and neck supple.   Skin:     General: Skin is warm.      Capillary Refill: Capillary refill takes less than 2 seconds.      Coloration: Skin is pale.   Neurological:      General: No focal deficit present.      Mental Status: She is alert and oriented to person, place, and time.   Psychiatric:         Mood and Affect: Mood normal.       Significant Labs:  Hb 7.2 today     Significant Imaging:

## 2022-05-05 VITALS
RESPIRATION RATE: 18 BRPM | OXYGEN SATURATION: 98 % | BODY MASS INDEX: 45.36 KG/M2 | SYSTOLIC BLOOD PRESSURE: 111 MMHG | HEIGHT: 67 IN | DIASTOLIC BLOOD PRESSURE: 76 MMHG | HEART RATE: 76 BPM | TEMPERATURE: 98 F | WEIGHT: 289 LBS

## 2022-05-05 PROBLEM — R65.10 SIRS (SYSTEMIC INFLAMMATORY RESPONSE SYNDROME): Status: RESOLVED | Noted: 2022-05-01 | Resolved: 2022-05-05

## 2022-05-05 PROBLEM — N49.3 FOURNIERS GANGRENE: Status: ACTIVE | Noted: 2022-05-05

## 2022-05-05 LAB
BACTERIA SPEC ANAEROBE CULT: NORMAL
BASOPHILS # BLD AUTO: 0.02 X10(3)/MCL (ref 0–0.2)
BASOPHILS NFR BLD AUTO: 0.2 %
EOSINOPHIL # BLD AUTO: 0.11 X10(3)/MCL (ref 0–0.9)
EOSINOPHIL NFR BLD AUTO: 1 %
ERYTHROCYTE [DISTWIDTH] IN BLOOD BY AUTOMATED COUNT: 15.5 % (ref 11.5–17)
HCT VFR BLD AUTO: 23.1 % (ref 37–47)
HGB BLD-MCNC: 7.3 GM/DL (ref 12–16)
IMM GRANULOCYTES # BLD AUTO: 0.29 X10(3)/MCL (ref 0–0.02)
IMM GRANULOCYTES NFR BLD AUTO: 2.6 % (ref 0–0.43)
LYMPHOCYTES # BLD AUTO: 2.82 X10(3)/MCL (ref 0.6–4.6)
LYMPHOCYTES NFR BLD AUTO: 24.9 %
MCH RBC QN AUTO: 26.9 PG (ref 27–31)
MCHC RBC AUTO-ENTMCNC: 31.6 MG/DL (ref 33–36)
MCV RBC AUTO: 85.2 FL (ref 80–94)
MONOCYTES # BLD AUTO: 1.1 X10(3)/MCL (ref 0.1–1.3)
MONOCYTES NFR BLD AUTO: 9.7 %
NEUTROPHILS # BLD AUTO: 7 X10(3)/MCL (ref 2.1–9.2)
NEUTROPHILS NFR BLD AUTO: 61.6 %
PLATELET # BLD AUTO: 318 X10(3)/MCL (ref 130–400)
PMV BLD AUTO: 9.9 FL (ref 9.4–12.4)
POCT GLUCOSE: 261 MG/DL (ref 70–110)
RBC # BLD AUTO: 2.71 X10(6)/MCL (ref 4.2–5.4)
WBC # SPEC AUTO: 11.3 X10(3)/MCL (ref 4.5–11.5)

## 2022-05-05 PROCEDURE — 36415 COLL VENOUS BLD VENIPUNCTURE: CPT | Performed by: EMERGENCY MEDICINE

## 2022-05-05 PROCEDURE — 25000003 PHARM REV CODE 250: Performed by: INTERNAL MEDICINE

## 2022-05-05 PROCEDURE — 25000003 PHARM REV CODE 250: Performed by: SURGERY

## 2022-05-05 PROCEDURE — A4216 STERILE WATER/SALINE, 10 ML: HCPCS | Performed by: EMERGENCY MEDICINE

## 2022-05-05 PROCEDURE — 25000003 PHARM REV CODE 250: Performed by: STUDENT IN AN ORGANIZED HEALTH CARE EDUCATION/TRAINING PROGRAM

## 2022-05-05 PROCEDURE — 25000003 PHARM REV CODE 250: Performed by: EMERGENCY MEDICINE

## 2022-05-05 PROCEDURE — 97530 THERAPEUTIC ACTIVITIES: CPT

## 2022-05-05 PROCEDURE — 63600175 PHARM REV CODE 636 W HCPCS: Performed by: EMERGENCY MEDICINE

## 2022-05-05 PROCEDURE — 85025 COMPLETE CBC W/AUTO DIFF WBC: CPT | Performed by: EMERGENCY MEDICINE

## 2022-05-05 PROCEDURE — 63600175 PHARM REV CODE 636 W HCPCS: Performed by: STUDENT IN AN ORGANIZED HEALTH CARE EDUCATION/TRAINING PROGRAM

## 2022-05-05 PROCEDURE — C9399 UNCLASSIFIED DRUGS OR BIOLOG: HCPCS | Performed by: EMERGENCY MEDICINE

## 2022-05-05 PROCEDURE — 63600175 PHARM REV CODE 636 W HCPCS: Performed by: SURGERY

## 2022-05-05 PROCEDURE — 63600175 PHARM REV CODE 636 W HCPCS: Performed by: INTERNAL MEDICINE

## 2022-05-05 RX ORDER — TIZANIDINE 4 MG/1
4 TABLET ORAL EVERY 8 HOURS PRN
Status: CANCELLED | OUTPATIENT
Start: 2022-05-05

## 2022-05-05 RX ORDER — SODIUM CHLORIDE 9 MG/ML
INJECTION, SOLUTION INTRAVENOUS CONTINUOUS
Status: CANCELLED | OUTPATIENT
Start: 2022-05-05

## 2022-05-05 RX ORDER — ONDANSETRON 2 MG/ML
4 INJECTION INTRAMUSCULAR; INTRAVENOUS EVERY 8 HOURS PRN
Status: CANCELLED | OUTPATIENT
Start: 2022-05-05

## 2022-05-05 RX ORDER — MORPHINE SULFATE 4 MG/ML
2 INJECTION, SOLUTION INTRAMUSCULAR; INTRAVENOUS EVERY 4 HOURS PRN
Status: CANCELLED | OUTPATIENT
Start: 2022-05-05

## 2022-05-05 RX ORDER — HYDROCODONE BITARTRATE AND ACETAMINOPHEN 5; 325 MG/1; MG/1
1 TABLET ORAL EVERY 8 HOURS PRN
Status: CANCELLED | OUTPATIENT
Start: 2022-05-05

## 2022-05-05 RX ORDER — SODIUM CHLORIDE 0.9 % (FLUSH) 0.9 %
10 SYRINGE (ML) INJECTION
Status: CANCELLED | OUTPATIENT
Start: 2022-05-05

## 2022-05-05 RX ORDER — GLUCAGON 1 MG
1 KIT INJECTION
Status: CANCELLED | OUTPATIENT
Start: 2022-05-05

## 2022-05-05 RX ORDER — FAMOTIDINE 10 MG/ML
20 INJECTION INTRAVENOUS 2 TIMES DAILY
Status: CANCELLED | OUTPATIENT
Start: 2022-05-05

## 2022-05-05 RX ORDER — MUPIROCIN 20 MG/G
OINTMENT TOPICAL 2 TIMES DAILY
Status: CANCELLED | OUTPATIENT
Start: 2022-05-05 | End: 2022-05-06

## 2022-05-05 RX ORDER — HYDROMORPHONE HYDROCHLORIDE 2 MG/ML
0.2 INJECTION, SOLUTION INTRAMUSCULAR; INTRAVENOUS; SUBCUTANEOUS EVERY 4 HOURS PRN
Status: CANCELLED | OUTPATIENT
Start: 2022-05-05

## 2022-05-05 RX ORDER — AMOXICILLIN 250 MG
1 CAPSULE ORAL 2 TIMES DAILY
Status: CANCELLED | OUTPATIENT
Start: 2022-05-05

## 2022-05-05 RX ORDER — INSULIN ASPART 100 [IU]/ML
1-10 INJECTION, SOLUTION INTRAVENOUS; SUBCUTANEOUS EVERY 6 HOURS PRN
Status: CANCELLED | OUTPATIENT
Start: 2022-05-05

## 2022-05-05 RX ORDER — SODIUM CHLORIDE 0.9 % (FLUSH) 0.9 %
10 SYRINGE (ML) INJECTION EVERY 6 HOURS
Status: CANCELLED | OUTPATIENT
Start: 2022-05-05

## 2022-05-05 RX ORDER — TALC
6 POWDER (GRAM) TOPICAL NIGHTLY PRN
Status: CANCELLED | OUTPATIENT
Start: 2022-05-05

## 2022-05-05 RX ORDER — ONDANSETRON 2 MG/ML
4 INJECTION INTRAMUSCULAR; INTRAVENOUS EVERY 12 HOURS PRN
Status: CANCELLED | OUTPATIENT
Start: 2022-05-05

## 2022-05-05 RX ADMIN — MUPIROCIN: 20 OINTMENT TOPICAL at 08:05

## 2022-05-05 RX ADMIN — THIAMINE HYDROCHLORIDE 250 MG: 100 INJECTION, SOLUTION INTRAMUSCULAR; INTRAVENOUS at 12:05

## 2022-05-05 RX ADMIN — HYDROMORPHONE HYDROCHLORIDE 0.2 MG: 2 INJECTION INTRAMUSCULAR; INTRAVENOUS; SUBCUTANEOUS at 03:05

## 2022-05-05 RX ADMIN — SODIUM CHLORIDE, PRESERVATIVE FREE 10 ML: 5 INJECTION INTRAVENOUS at 12:05

## 2022-05-05 RX ADMIN — INSULIN DETEMIR 25 UNITS: 100 INJECTION, SOLUTION SUBCUTANEOUS at 08:05

## 2022-05-05 RX ADMIN — HYDROMORPHONE HYDROCHLORIDE 0.2 MG: 2 INJECTION INTRAMUSCULAR; INTRAVENOUS; SUBCUTANEOUS at 11:05

## 2022-05-05 RX ADMIN — VANCOMYCIN HYDROCHLORIDE 2000 MG: 1 INJECTION, POWDER, LYOPHILIZED, FOR SOLUTION INTRAVENOUS at 08:05

## 2022-05-05 RX ADMIN — TIZANIDINE 4 MG: 4 TABLET ORAL at 09:05

## 2022-05-05 RX ADMIN — SODIUM CHLORIDE, PRESERVATIVE FREE 10 ML: 5 INJECTION INTRAVENOUS at 06:05

## 2022-05-05 RX ADMIN — SENNOSIDES, DOCUSATE SODIUM 1 TABLET: 8.6; 5 TABLET ORAL at 08:05

## 2022-05-05 RX ADMIN — ONDANSETRON 4 MG: 2 INJECTION INTRAMUSCULAR; INTRAVENOUS at 03:05

## 2022-05-05 RX ADMIN — HYDROCODONE BITARTRATE AND ACETAMINOPHEN 1 TABLET: 5; 325 TABLET ORAL at 08:05

## 2022-05-05 RX ADMIN — PIPERACILLIN SODIUM AND TAZOBACTAM SODIUM 4.5 G: 4; 500 INJECTION, POWDER, FOR SOLUTION INTRAVENOUS at 03:05

## 2022-05-05 RX ADMIN — PIPERACILLIN SODIUM AND TAZOBACTAM SODIUM 4.5 G: 4; 500 INJECTION, POWDER, FOR SOLUTION INTRAVENOUS at 11:05

## 2022-05-05 NOTE — DISCHARGE SUMMARY
[facility]  Hospital Medicine  Discharge Summary      Patient Name: Shell Rolon  MRN: [id number]  Patient Class: IP- Inpatient  Admission Date: 5/1/2022  Hospital Length of Stay: 4 days  Discharge Date and Time:  05/05/2022 11:56 AM  Attending Physician: Shaun Sahu MD   Discharging Provider: Shon Carreno MD  Primary Care Provider: Mackenzie Clay NP      HPI:   42yo BF with h/o HTN, Morbid Obesity, new dx of DM2 admitted presented with Abscess of Left Buttock near Perirectal area worsening over 10 days. Given Bactrim by PCP on 4/25. Found to have very large abscess on exam 26.5cm x 6cm. CT A/P showed locules of gas in right ischiorectal fossa extending into right gluteal region suspicious for Ken's Gangrene. A1c 10.4 and Gluc 438. WBC 30.6 and . [facility] consulted for new dx DM.      Procedure(s) (LRB):  DEBRIDEMENT, WOUND (N/A)      Hospital Course:   41-year-old  female past medical history of new onset diabetes, hypertension, morbid abuse was admitted to the hospital with the rectal pain abscess worsening.  Patient has been having this issue for about 10 days prior to arriving to the hospital.  On 04/25/2022 she was started on p.o. Bactrim.  She had an abscess of 26 x 5 cm and 6 cm.  A CT showed loculated gas in the right ischial rectal fossa extending into the right gluteal region.  Finding compatible with Ken's gangrene.  Her blood work showed elevation of wbc 30.6 and her hemoglobin A1c was 10.4 and blood sugar on arrival was 430 8. Her vital signs were stable.  She had a fever of 100.3 at the most.   Patient underwent surgical intervention.  She had incision remained of the gluteal abscess.  She was started on IV antibiotic to cover for g negatives, g positives and anaerobes.  During the hospital stay noticed the patient had only been and hematocrit was in the low side as well.  Wound Care was consulted and patient continued to appear  improving.   Due to the severely of conditions and her in for further treatment patient was recommended to go to LTAC.  Today she is being transferred to long-term care facility where she will continue treatment for Ken's gangrene, diabetes and close monitoring of anemia.  In addition to that patient requires strengthening and pain management.      Physical Exam  Constitutional:       Appearance: Normal appearance. She is obese.   HENT:      Head: Normocephalic and atraumatic.      Right Ear: Tympanic membrane normal.      Left Ear: Tympanic membrane normal.      Nose: Nose normal.      Mouth/Throat:      Mouth: Mucous membranes are moist.      Pharynx: Oropharynx is clear.   Eyes:      Extraocular Movements: Extraocular movements intact.      Conjunctiva/sclera: Conjunctivae normal.      Pupils: Pupils are equal, round, and reactive to light.   Cardiovascular:      Rate and Rhythm: Normal rate and regular rhythm.      Pulses: Normal pulses.   Pulmonary:      Effort: Pulmonary effort is normal.      Breath sounds: Normal breath sounds.   Abdominal:      General: Abdomen is flat. Bowel sounds are normal.      Palpations: Abdomen is soft.   Genitourinary:     Comments:  Significant improvement of the abscess area.  The surgical area appears to be improving surgical drainage.  Good granulous tissues at the bottom of the wound  Mild erythema around the surgical site.  Musculoskeletal:         General: Normal range of motion.      Cervical back: Normal range of motion and neck supple.   Skin:     General: Skin is warm.      Capillary Refill: Capillary refill takes less than 2 seconds.   Neurological:      General: No focal deficit present.      Mental Status: She is alert and oriented to person, place, and time.   Psychiatric:         Mood and Affect: Mood normal.       Goals of Care Treatment Preferences:  Code Status: Full Code      Consults:   Consults (From admission, onward)        Status Ordering Provider      "Inpatient consult to Social Work/Case Management  Once        Provider:  (Not yet assigned)    Acknowledged VIKI ORDOÑEZ     Inpatient consult to Social Work/Case Management  Once        Provider:  (Not yet assigned)    Acknowledged VIKI ORDOÑEZ     Inpatient consult to Social Work  Once        Provider:  (Not yet assigned)    Acknowledged VIKI ORDOÑEZ     Inpatient consult to General Surgery  Once        Provider:  Viki Ordoñez MD    Acknowledged SEVERIANO BARRON     Inpatient consult to Hospitalist  Once        Provider:  Walker Oleary MD    Acknowledged SANDEE FIORE     Pharmacy to dose Vancomycin consult  Once        Provider:  (Not yet assigned)   "And" Linked Group Details    Acknowledged SANDEE FIORE          No new Assessment & Plan notes have been filed under this hospital service since the last note was generated.  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    Significant Diagnostic Studies: Labs:   BMP:   Recent Labs   Lab 05/04/22  0513   CO2 26   BUN 5.0*   CREATININE 0.71   CALCIUM 8.3*     Microbiology: Blood Culture No results found for: LABBLOO  Radiology: X-Ray: CXR: X-Ray Chest 1 View (CXR):   Results for orders placed or performed during the hospital encounter of " 05/01/22   X-Ray Chest 1 View    Narrative    EXAMINATION:  XR CHEST 1 VIEW    CLINICAL HISTORY:  PICC placement;    TECHNIQUE:  Single frontal view of the chest was performed.    COMPARISON:  None    FINDINGS:  There is a PICC line on the right with the distal end overlying superior vena cava.  Lungs are clear.  Heart size is within normal limits.      Impression    PICC line as described above.      Electronically signed by: Javon Avery MD  Date:    05/03/2022  Time:    21:08     CT scan: CT ABDOMEN PELVIS WITH CONTRAST: No results found for this visit on 05/01/22.    Pending Diagnostic Studies:     None         Medications:  Transfer Medications (for Discharge Readmit only):   Current Facility-Administered Medications   Medication Dose Route Frequency Provider Last Rate Last Admin    0.9%  NaCl infusion   Intravenous Continuous Carlos Borja MD   Stopped at 05/01/22 1940    0.9%  NaCl infusion   Intravenous Continuous Michael Ordoñez  mL/hr at 05/04/22 2215 New Bag at 05/04/22 2215    dextrose 10% bolus 125 mL  12.5 g Intravenous PRN Walker Oleary MD        dextrose 10% bolus 250 mL  25 g Intravenous PRN Walker Oleary MD        glucagon (human recombinant) injection 1 mg  1 mg Intramuscular PRN Walker Oleary MD        HYDROcodone-acetaminophen 5-325 mg per tablet 1 tablet  1 tablet Oral Q8H PRN Shaun Sahu MD   1 tablet at 05/05/22 0835    HYDROmorphone (PF) injection 0.2 mg  0.2 mg Intravenous Q4H PRN Michael Ordoñez MD   0.2 mg at 05/05/22 1124    insulin aspart U-100 injection 1-10 Units  1-10 Units Subcutaneous Q6H PRN Walker Oleary MD   4 Units at 05/04/22 2202    insulin detemir U-100 injection 25 Units  25 Units Subcutaneous Daily Shon Carreon MD   25 Units at 05/05/22 0835    melatonin tablet 6 mg  6 mg Oral Nightly PRN Carlos Borja MD   6 mg at 05/04/22 2154    morphine injection 2 mg  2 mg Intravenous Q4H PRN Walker Oleary MD    2 mg at 05/01/22 0532    mupirocin 2 % ointment   Nasal BID Michael Ordoñez MD   Given at 05/05/22 0847    ondansetron injection 4 mg  4 mg Intravenous Q8H PRN Carlos Borja MD   4 mg at 05/05/22 0309    ondansetron injection 4 mg  4 mg Intravenous Q12H PRN Michael Ordoñez MD        piperacillin-tazobactam (ZOSYN) 4.5 g in sodium chloride 0.9% 100 mL IVPB (MB+)  4.5 g Intravenous Q8H Carlos Borja MD 25 mL/hr at 05/05/22 1103 4.5 g at 05/05/22 1103    sars-cov-2 (covid-19) (MODERNA COVID-19) 100 mcg/0.5 ml injection 0.25 mL  0.25 mL Intramuscular vaccine x 1 dose Michael Ordoñez MD        senna-docusate 8.6-50 mg per tablet 1 tablet  1 tablet Oral BID Carlos Borja MD   1 tablet at 05/05/22 0835    sodium chloride 0.9% flush 10 mL  10 mL Intravenous PRN Carlos Borja MD        sodium chloride 0.9% flush 10 mL  10 mL Intravenous Q6H Shon Carreon MD   10 mL at 05/05/22 0600    And    sodium chloride 0.9% flush 10 mL  10 mL Intravenous PRN Shon Carreon MD        thiamine (B-1) 250 mg in dextrose 5 % 100 mL IVPB  250 mg Intravenous Daily Michael Ordoñez MD   Stopped at 05/04/22 0942    tiZANidine tablet 4 mg  4 mg Oral Q8H PRN Carlos Borja MD   4 mg at 05/05/22 0908    vancomycin (VANCOCIN) 2,000 mg in dextrose 5 % 500 mL IVPB  2,000 mg Intravenous Q12H Shaun Sahu MD   Stopped at 05/05/22 1047    vancomycin - pharmacy to dose   Intravenous pharmacy to manage frequency Carlos Borja MD           Indwelling Lines/Drains at time of discharge:   Lines/Drains/Airways     Peripherally Inserted Central Catheter Line  Duration           PICC Double Lumen 05/03/22 1552 right basilic 1 day                Time spent on the discharge of patient: 36 minutes         Shon Carreon MD  Department of Hospital Medicine  [facility]

## 2022-05-05 NOTE — PT/OT/SLP PROGRESS
Physical Therapy Treatment    Patient Name:  Shell Rolon   MRN:  [id number]    Recommendations:     Discharge Recommendations:  LTACH (long-term acute care [facility])   Discharge Equipment Recommendations: none   Barriers to discharge: None    Assessment:     Shell Rolon is a 41 y.o. female admitted with a medical diagnosis of Perianal abscess.  She presents with the following impairments/functional limitations:  impaired functional mobilty, pain.  Patient is improving with gait mechanics and efficiency secondary to less pain from her wound in her perirectal area.      Rehab Prognosis: Good; patient would benefit from acute skilled PT services to address these deficits and reach maximum level of function.    Recent Surgery: Procedure(s) (LRB):  DEBRIDEMENT, WOUND (N/A) 4 Days Post-Op    Plan:     During this hospitalization, patient to be seen daily to address the identified rehab impairments via gait training, therapeutic activities, therapeutic exercises and progress toward the following goals:    · Plan of Care Expires:  05/30/22    Subjective     Chief Complaint: pain  Patient/Family Comments/goals: decrease pain and heal wound  Pain/Comfort:  · Pain Rating 1: 4/10  · Location - Side 1: Right  · Location 1: perirectal  · Pain Addressed 1: Reposition, Pre-medicate for activity  · Pain Rating Post-Intervention 1: 4/10      Objective:     Communicated with nurse prior to session.  Patient found HOB elevated with peripheral IV upon PT entry to room.     General Precautions: Standard, fall   Orthopedic Precautions:    Braces:    Respiratory Status: Room air     Functional Mobility:  · Bed Mobility:     · Supine to Sit: modified independence  · Transfers:     · Sit to Stand:  modified independence with no AD  · Gait: 200 ft pushing IV pole with supervision  · Balance: standing balance good      AM-PAC 6 CLICK MOBILITY          Therapeutic Activities and Exercises:   Gait down hallway.    Patient left sitting edge of bed  with call button in reach..    GOALS:   Multidisciplinary Problems     Physical Therapy Goals        Problem: Physical Therapy    Goal Priority Disciplines Outcome Goal Variances Interventions   Physical Therapy Goal     PT, PT/OT Ongoing, Progressing     Description: Goals to be met by: 22     Patient will increase functional independence with mobility by performin. Supine to sit with [place]  2. Sit to stand transfer with [place]  3. Gait  x 400 feet with [place] using No Assistive Device.                      Time Tracking:     PT Received On: 22  PT Start Time: 1115     PT Stop Time: 1130  PT Total Time (min): 15 min     Billable Minutes: Therapeutic Activity 15    Treatment Type: Treatment  PT/PTA: PT           2022

## 2022-05-05 NOTE — PLAN OF CARE
Problem: Adult Inpatient Plan of Care  Goal: Plan of Care Review  Outcome: Ongoing, Progressing  Goal: Patient-Specific Goal (Individualized)  Outcome: Ongoing, Progressing  Goal: Absence of Hospital-Acquired Illness or Injury  Outcome: Ongoing, Progressing  Goal: Optimal Comfort and Wellbeing  Outcome: Ongoing, Progressing  Goal: Readiness for Transition of Care  Outcome: Ongoing, Progressing     Problem: Bariatric Environmental Safety  Goal: Safety Maintained with Care  Outcome: Ongoing, Progressing     Problem: Impaired Wound Healing  Goal: Optimal Wound Healing  Outcome: Ongoing, Progressing     Problem: Pain Acute  Goal: Acceptable Pain Control and Functional Ability  Outcome: Ongoing, Progressing     Problem: Infection  Goal: Absence of Infection Signs and Symptoms  Outcome: Ongoing, Progressing     Problem: Skin Injury Risk Increased  Goal: Skin Health and Integrity  Outcome: Ongoing, Progressing     Problem: Diabetes Comorbidity  Goal: Blood Glucose Level Within Targeted Range  Outcome: Ongoing, Progressing     Problem: Fall Injury Risk  Goal: Absence of Fall and Fall-Related Injury  Outcome: Ongoing, Progressing     Problem: Balance Impairment (Functional Deficit)  Goal: Improved Balance and Postural Control  Outcome: Ongoing, Progressing     Problem: Muscle Strength Impairment (Functional Deficit)  Goal: Improved Muscle Strength  Outcome: Ongoing, Progressing

## 2022-05-05 NOTE — ASSESSMENT & PLAN NOTE
- SSI  -Recommend start po glucophage if no more surgery   Diabetic Education performed.  Next diabetic diet as well

## 2022-05-05 NOTE — ASSESSMENT & PLAN NOTE
- s/p I&D large abscess  - IV Vanc/Zosyn  - wound care  - analgesia prn pain   Continue care to LTAC

## 2022-05-05 NOTE — PLAN OF CARE
Pt is accepted to go to [facility]. Care [facility] today.  Notified DR. ROUSE at 10:20

## 2022-05-09 PROBLEM — R11.0 NAUSEA: Status: ACTIVE | Noted: 2022-05-09

## 2022-05-10 NOTE — OP NOTE
[facility]                     [address] [id number]    PATIENT NAME:      CHRISTINA BEYER   YOB: 1980  CSN:               [id number]  MRN:               [id number]  ADMIT DATE:        05/01/2022 03:04:00  PHYSICIAN:         Michael Ordoñez MD                          OPERATIVE REPORT      DATE OF SURGERY:        SURGEON:  Michael Ordoñez MD    ADMITTING DIAGNOSIS:  A large abscess on the right perianal region.    PROCEDURES:  Incision, drainage, debridement and washout of a right perianal   abscess, 15 cm long, about 4 or 5 cm wide and about 7 cm deep.       Patient is a 41-year-old  female with a history of a large right   perirectal and perineal abscess that was 15 cm long, 4 cm wide and about 7 cm   deep.  Patient did well.      Patient underwent lithotomy positioning, general anesthetic and then had   incision and drainage done with a 15-blade scalpel of the abscessed area.  It   was evacuated.  Cultures were obtained, aerobic, anaerobic, and Gram stain.    Patient had good hemostasis.  Bovie cautery was used for hemostasis along with   TXA and thrombin spray and surgical gauze.  Sterile dressings were applied.  No   problems were encountered.  Patient tolerated the procedure well.     I appreciate the consultation referral from Dr. Shaun Sahu.      Wound Care will be consulted and further treatment is pending these results.        ______________________________  MD KAMERON Andrews/ANIBAL  DD:  05/01/2022  Time:  07:23PM  DT:  05/01/2022  Time:  07:49PM  Job #:  [id number]      OPERATIVE REPORT

## 2022-05-11 LAB — CRP SERPL-MCNC: 137 MG/L

## 2022-05-13 PROBLEM — N17.9 ACUTE RENAL FAILURE: Status: ACTIVE | Noted: 2022-05-13

## 2022-05-13 LAB — BACTERIA SPEC CULT: NO GROWTH

## 2022-05-23 PROBLEM — N49.3 FOURNIERS GANGRENE: Status: RESOLVED | Noted: 2022-05-05 | Resolved: 2022-05-23

## 2022-05-23 PROBLEM — N19 RENAL FAILURE SYNDROME: Status: ACTIVE | Noted: 2022-05-13

## 2022-05-23 PROBLEM — N17.9 ACUTE RENAL FAILURE: Status: RESOLVED | Noted: 2022-05-13 | Resolved: 2022-05-23

## 2022-05-25 ENCOUNTER — HOSPITAL ENCOUNTER (OUTPATIENT)
Dept: WOUND CARE | Facility: HOSPITAL | Age: 42
Discharge: HOME | End: 2022-05-25
Attending: NURSE PRACTITIONER
Payer: COMMERCIAL

## 2022-05-25 VITALS
HEART RATE: 82 BPM | TEMPERATURE: 98 F | RESPIRATION RATE: 20 BRPM | HEIGHT: 67 IN | WEIGHT: 284 LBS | BODY MASS INDEX: 44.57 KG/M2 | SYSTOLIC BLOOD PRESSURE: 171 MMHG | DIASTOLIC BLOOD PRESSURE: 89 MMHG

## 2022-05-25 DIAGNOSIS — Z87.438 HISTORY OF FOURNIER'S GANGRENE: ICD-10-CM

## 2022-05-25 DIAGNOSIS — E66.01 MORBIDLY OBESE: ICD-10-CM

## 2022-05-25 DIAGNOSIS — E11.65 UNCONTROLLED TYPE 2 DIABETES MELLITUS WITH HYPERGLYCEMIA: ICD-10-CM

## 2022-05-25 DIAGNOSIS — L02.215 PERINEAL ABSCESS: Primary | ICD-10-CM

## 2022-05-25 PROCEDURE — 99213 OFFICE O/P EST LOW 20 MIN: CPT

## 2022-05-25 RX ORDER — DOXYCYCLINE 100 MG/1
100 CAPSULE ORAL 2 TIMES DAILY
COMMUNITY
Start: 2022-05-23 | End: 2022-06-01 | Stop reason: ALTCHOICE

## 2022-05-26 NOTE — PROGRESS NOTES
Subjective:       Patient ID: Shell Rolon is a 42 y.o. female.    Chief Complaint: Wound Care (Right gluteal perineal)    HPI   Ms. Rolon is being seen for an open wound to the right gluteal/perineum.    She was initially admitted to LT following the development of a boil that continued to worsen.  She underwent surgical debridement of the area on 5/1/22 by Dr. 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  Through the course of her stay at [facility] she did develop renal failure due to IV Vancomycin.  She is being follow by Dr. Winston Hall, Nephrology.  She is also a recently diagnosed T2DM patient which is being managed by her PCP, Dr. Riggins.  She was followed in LT by wound care, and presents to clinic today for continuing care.    Her wound is clean, without drainage or S/S of infection.  She denies pain and is able to tolerate mechanical debridement well.  During the mechanical debridement process, two retained stiches were removed using forceps and scissors.     She was given a recipe for hypertonic saline solution for her personal use at home with wound treatment.  She does not have home health.       Review of Systems   Skin: Positive for wound.   All other systems reviewed and are negative.        Objective:        Physical Exam  Vitals (171/89) reviewed.   Constitutional:       Appearance: She is obese.   HENT:      Head: Normocephalic.   Cardiovascular:      Heart sounds: Murmur heard.   Pulmonary:      Effort: Pulmonary effort is normal.   Genitourinary:     Comments: Wound - right gluteal/perineal  Skin:     General: Skin is warm and dry.   Neurological:      General: No focal deficit present.      Mental Status: She is alert and oriented to person, place, and time.   Psychiatric:         Mood and Affect: Mood normal.            Incision/Site 05/01/22 1928 Right Perineum (Active)   05/01/22 1928   Present Prior to Hospital Arrival?:    Side: Right   Location: Perineum    Orientation:    Incision Type:    Closure Method:    Additional Comments:    Removal Indication and Assessment:    Wound Outcome:    Removal Indications:    Dressing Appearance Intact 05/25/22 1521   Drainage Amount Moderate 05/25/22 1521   Drainage Characteristics/Odor Clear;Serous 05/25/22 1521   Appearance Granulating 05/25/22 1521   Red (%), Wound Tissue Color 100 % 05/25/22 1521   Periwound Area Intact 05/25/22 1521   Wound Edges Defined 05/25/22 1521   Wound Length (cm) 7.5 cm 05/25/22 1521   Wound Width (cm) 0.5 cm 05/25/22 1521   Wound Depth (cm) 1.6 cm 05/25/22 1521   Wound Volume (cm^3) 6 cm^3 05/25/22 1521   Wound Surface Area (cm^2) 3.75 cm^2 05/25/22 1521   Care Wound cleanser 05/25/22 1521   Dressing Applied 05/25/22 1521                 Assessment:         ICD-10-CM ICD-9-CM   1. Perineal abscess  L02.215 682.2   2. Morbidly obese  E66.01 278.01   3. Uncontrolled type 2 diabetes mellitus with hyperglycemia  E11.65 250.02   4. History of Ken's gangrene  Z87.438 V13.89         Procedures:   Excisional debridement performed:  [] Yes [x] No   Selective debridement performed:  [] Yes [x] No   Mechanical debridement performed:  [x] Yes [] No   Silver nitrate applied:    [] Yes [x] No   Labs ordered this visit:   [] Yes [x] No   Imaging ordered this visit:   [] Yes [x] No   Tissue pathology and/or culture taken:  [] Yes [x] No   Retained stiches removed:   [x] Yes [] No  X 2    Procedures:  HOME HEALTH AGENCY: N/A    Patient Orders:  Right gluteal/Perineal wound-/cleanse with saline. Pack hypertonic moistened 1/2 inch plain packing, cover with gauze secure with tape. Change daily.  Recipe for hypertonic saline solution provided to patient    Follow up in about 2 weeks (around 6/8/2022) for gluteal.

## 2022-05-30 LAB — FUNGUS SPEC CULT: NORMAL

## 2022-06-01 ENCOUNTER — HOSPITAL ENCOUNTER (OUTPATIENT)
Dept: WOUND CARE | Facility: HOSPITAL | Age: 42
Discharge: HOME | End: 2022-06-01
Attending: NURSE PRACTITIONER
Payer: COMMERCIAL

## 2022-06-01 VITALS
HEART RATE: 94 BPM | SYSTOLIC BLOOD PRESSURE: 158 MMHG | BODY MASS INDEX: 44.57 KG/M2 | HEIGHT: 67 IN | WEIGHT: 284 LBS | TEMPERATURE: 98 F | DIASTOLIC BLOOD PRESSURE: 84 MMHG

## 2022-06-01 DIAGNOSIS — K61.0 PERIANAL ABSCESS: Primary | ICD-10-CM

## 2022-06-01 PROCEDURE — 99213 OFFICE O/P EST LOW 20 MIN: CPT

## 2022-06-01 PROCEDURE — 17250 CHEM CAUT OF GRANLTJ TISSUE: CPT

## 2022-06-01 RX ORDER — SILVER NITRATE 38.21; 12.74 MG/1; MG/1
1 STICK TOPICAL ONCE
Status: COMPLETED | OUTPATIENT
Start: 2022-06-01 | End: 2022-06-01

## 2022-06-01 RX ADMIN — SILVER NITRATE 1 APPLICATOR: 38.21; 12.74 STICK TOPICAL at 10:06

## 2022-06-01 NOTE — PROGRESS NOTES
Subjective:       Patient ID: Shell Rolon is a 42 y.o. female.    Chief Complaint: Non-healing Wound Follow Up (Right gluteal perineal )    HPI     Ms. Rolon is being seen for an open wound to the right gluteal/perineum.     She was initially admitted to LT following the development of a boil that continued to worsen.  She underwent surgical debridement of the area on 5/1/22 by Dr. 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  Through the course of her stay at [facility] she did develop renal failure due to IV Vancomycin.  She is being follow by Dr. Winston Hall, Nephrology.  She is also a recently diagnosed T2DM patient which is being managed by her PCP, Dr. Riggins.  She was followed in LT by wound care, and presents to clinic today for continuing care.     Her wound is remains clean, without drainage or S/S of infection.  She denies pain and is able to tolerate mechanical debridement well.  During the mechanical debridement process today, three retained stiches were removed using forceps and scissors.    She was given a recipe for hypertonic saline solution for her personal use at home with wound treatment.  She does not have home health.     Of note, today her B/P was elevated.  She states that she has not taken her B/P this morning.  She has a F/Up with her PCP, Dr. Riggins on 6/6/22.  She also reports that her blood sugars have run under 150 for several days.    Review of Systems   Skin: Positive for wound.   All other systems reviewed and are negative.        Objective:        Physical Exam  Vitals (158/84) reviewed.   Constitutional:       Appearance: She is obese.   HENT:      Head: Normocephalic.   Cardiovascular:      Rate and Rhythm: Normal rate.      Pulses: Normal pulses.   Pulmonary:      Effort: Pulmonary effort is normal.   Musculoskeletal:         General: Normal range of motion.   Skin:     General: Skin is warm and dry.   Neurological:      General: No focal deficit  "present.      Mental Status: She is alert and oriented to person, place, and time.   Psychiatric:         Mood and Affect: Mood normal.            Incision/Site 05/01/22 1928 Right Perineum (Active)   05/01/22 1928   Present Prior to Hospital Arrival?:    Side: Right   Location: Perineum   Orientation:    Incision Type:    Closure Method:    Additional Comments:    Removal Indication and Assessment:    Wound Outcome:    Removal Indications:    Dressing Appearance Intact 06/01/22 0948   Drainage Amount Moderate 06/01/22 0948   Drainage Characteristics/Odor Serous;Clear 06/01/22 0948   Appearance Granulating 06/01/22 0948   Red (%), Wound Tissue Color 75 % 06/01/22 0948   Yellow (%), Wound Tissue Color 25 % 06/01/22 0948   Periwound Area Intact 06/01/22 0948   Wound Edges Defined 06/01/22 0948   Wound Length (cm) 5.5 cm 06/01/22 0948   Wound Width (cm) 0.5 cm 06/01/22 0948   Wound Depth (cm) 1.2 cm 06/01/22 0948   Wound Volume (cm^3) 3.3 cm^3 06/01/22 0948   Wound Surface Area (cm^2) 2.75 cm^2 06/01/22 0948   Care Wound cleanser 06/01/22 0948   Dressing Applied 06/01/22 0948         Assessment:         ICD-10-CM ICD-9-CM   1. Perianal abscess  K61.0 566         Procedures:   Excisional debridement performed:  [] Yes [x] No   Selective debridement performed:  [] Yes [x] No   Mechanical debridement performed:  [x] Yes [] No   Silver nitrate applied:    [x] Yes [] No  X 1   Labs ordered this visit:   [] Yes [x] No   Imaging ordered this visit:   [] Yes [x] No   Tissue pathology and/or culture taken:  [] Yes [x] No     Procedures:    Debridement     Date/Time: 6/1/2022 9:21 AM  Performed by: Maggi Fields NP  Authorized by: Maggi Fields NP     Time out: Immediately prior to procedure a "time out" was called to verify the correct patient, procedure, equipment, support staff and site/side marked as required.   Consent Done?:  Yes (Verbal)  Local anesthesia used?: No       Wound Details:    Location:  Perineum " (Right)    Debridement - 1st Wound - Type: Mechanical; removal of retained stitches; chemical debridement.       Length (cm):  5.5       Area (sq cm):  2.75       Width (cm):  0.5       Percent Debrided (%):  100       Depth (cm):  1.2       Total Area Debrided (sq cm):  2.75    Depth of debridement:  Epidermis/Dermis     Devitalized tissue debrided:  Exudate and Biofilm    Instruments:  Forceps and Scissors (4 x 4 )      Removal of retained stitches x 3  Silver Alginate used on wound margins         HOME HEALTH AGENCY:  N/A    Patient Orders:  Right gluteal/Perineal wound-/cleanse with saline. Pack hypertonic moistened 1/2 inch plain packing, cover with gauze secure with tape. Change daily.      Follow up in about 2 weeks (around 6/15/2022) for perineal.

## 2022-06-15 ENCOUNTER — HOSPITAL ENCOUNTER (OUTPATIENT)
Dept: WOUND CARE | Facility: HOSPITAL | Age: 42
Discharge: HOME | End: 2022-06-15
Attending: NURSE PRACTITIONER
Payer: COMMERCIAL

## 2022-06-15 ENCOUNTER — HOSPITAL ENCOUNTER (OUTPATIENT)
Dept: RADIOLOGY | Facility: HOSPITAL | Age: 42
Discharge: HOME | End: 2022-06-15
Attending: INTERNAL MEDICINE
Payer: COMMERCIAL

## 2022-06-15 VITALS
HEIGHT: 67 IN | WEIGHT: 284 LBS | HEART RATE: 94 BPM | DIASTOLIC BLOOD PRESSURE: 102 MMHG | BODY MASS INDEX: 44.57 KG/M2 | SYSTOLIC BLOOD PRESSURE: 167 MMHG | RESPIRATION RATE: 18 BRPM

## 2022-06-15 DIAGNOSIS — K61.0 PERIANAL ABSCESS: Primary | ICD-10-CM

## 2022-06-15 DIAGNOSIS — E66.01 MORBIDLY OBESE: ICD-10-CM

## 2022-06-15 DIAGNOSIS — Z12.31 ENCOUNTER FOR SCREENING MAMMOGRAM FOR BREAST CANCER: ICD-10-CM

## 2022-06-15 DIAGNOSIS — E11.65 UNCONTROLLED TYPE 2 DIABETES MELLITUS WITH HYPERGLYCEMIA: ICD-10-CM

## 2022-06-15 DIAGNOSIS — Z87.438 HISTORY OF FOURNIER'S GANGRENE: ICD-10-CM

## 2022-06-15 PROCEDURE — 99213 OFFICE O/P EST LOW 20 MIN: CPT

## 2022-06-15 PROCEDURE — 77067 SCR MAMMO BI INCL CAD: CPT | Mod: TC

## 2022-06-15 PROCEDURE — 77067 MAMMO DIGITAL SCREENING BILAT WITH TOMO: ICD-10-PCS | Mod: 26,,, | Performed by: RADIOLOGY

## 2022-06-15 PROCEDURE — 77067 SCR MAMMO BI INCL CAD: CPT | Mod: 26,,, | Performed by: RADIOLOGY

## 2022-06-15 PROCEDURE — 77063 MAMMO DIGITAL SCREENING BILAT WITH TOMO: ICD-10-PCS | Mod: 26,,, | Performed by: RADIOLOGY

## 2022-06-15 PROCEDURE — 17250 CHEM CAUT OF GRANLTJ TISSUE: CPT

## 2022-06-15 PROCEDURE — 77063 BREAST TOMOSYNTHESIS BI: CPT | Mod: 26,,, | Performed by: RADIOLOGY

## 2022-06-15 NOTE — PROGRESS NOTES
Subjective:       Patient ID: Shell Rolon is a 42 y.o. female.    Chief Complaint: Perianal abscess    HPI    Ms. Rolon returns to us today for an open wound to the right gluteal which is the result of a perianal abscess.  The wound has continued to improve weekly with the use of hypertonic saline packing.  Today there are two very small openings which were chemically cauterized using silver nitrate.   No additional debridement was necessary.  The patient was instructed to begin covering the area with a simple bandage.  Packing is no longer necessary.  She is to return to clinic in two weeks if necessary and/or if the wound is not healed by that time.  If the wound is healed she has been told that she is welcomed to call to cancel appt.    Review of Systems   Skin: Positive for wound.   All other systems reviewed and are negative.        Objective:      Vitals:    06/15/22 1039   BP: (!) 167/102   Pulse: 94   Resp: 18       Physical Exam  Vitals (158/84) reviewed.   Constitutional:       Appearance: She is obese.   HENT:      Head: Normocephalic.   Cardiovascular:      Rate and Rhythm: Normal rate.      Pulses: Normal pulses.   Pulmonary:      Effort: Pulmonary effort is normal.   Musculoskeletal:         General: Normal range of motion.   Skin:     General: Skin is warm and dry.   Neurological:      General: No focal deficit present.      Mental Status: She is alert and oriented to person, place, and time.   Psychiatric:         Mood and Affect: Mood normal.            Incision/Site 05/01/22 1928 Right Perineum (Active)   05/01/22 1928   Present Prior to Hospital Arrival?:    Side: Right   Location: Perineum   Orientation:    Incision Type:    Closure Method:    Additional Comments:    Removal Indication and Assessment:    Wound Outcome:    Removal Indications:    Dressing Appearance Open to air 06/15/22 1040   Drainage Amount Moderate 06/15/22 1040   Drainage Characteristics/Odor Serosanguineous 06/15/22 1040    Appearance Red 06/15/22 1040   Periwound Area Intact 06/15/22 1040   Wound Edges Open 06/15/22 1040   Wound Length (cm) 1 cm 06/15/22 1040   Wound Width (cm) 0.5 cm 06/15/22 1040   Wound Depth (cm) 0.5 cm 06/15/22 1040   Wound Volume (cm^3) 0.25 cm^3 06/15/22 1040   Wound Surface Area (cm^2) 0.5 cm^2 06/15/22 1040   Care Cleansed with:;Wound cleanser 06/15/22 1040   Dressing Applied 06/15/22 1040   Dressing Change Due 06/16/22 06/15/22 1040       Right perineal   ML - island dressing      Assessment:         ICD-10-CM ICD-9-CM   1. Perianal abscess  K61.0 566   2. Morbidly obese  E66.01 278.01   3. Uncontrolled type 2 diabetes mellitus with hyperglycemia  E11.65 250.02   4. History of Ken's gangrene  Z87.438 V13.89         Procedures:   Excisional debridement performed:  [] Yes [x] No   Selective debridement performed:  [] Yes [x] No   Mechanical debridement performed:  [] Yes [x] No   Silver nitrate applied:    [x] Yes [] No   Labs ordered this visit:   [] Yes [x] No   Imaging ordered this visit:   [] Yes [x] No   Tissue pathology and/or culture taken:  [] Yes [x] No     Procedures:  Silver nitrate applied to hypergranulated tissue      MEDICATIONS    Current Outpatient Medications:     amLODIPine (NORVASC) 2.5 MG tablet, Take 1 tablet (2.5 mg total) by mouth once daily., Disp: 30 tablet, Rfl: 11    blood sugar diagnostic Strp, 1 strip by Misc.(Non-Drug; Combo Route) route 4 (four) times daily before meals and nightly., Disp: 200 strip, Rfl: 0    ferrous sulfate 325 (65 FE) MG EC tablet, Take 1 tablet (325 mg total) by mouth once daily., Disp: 100 tablet, Rfl: 0    glimepiride (AMARYL) 2 MG tablet, Take 1 tablet (2 mg total) by mouth daily with breakfast. (Patient taking differently: Take 2 mg by mouth 2 (two) times a day.), Disp: 90 tablet, Rfl: 3    HYDROcodone-acetaminophen (NORCO) 7.5-325 mg per tablet, Take 1 tablet by mouth every 6 (six) hours as needed for Pain., Disp: 25 tablet, Rfl: 0     insulin detemir U-100 (LEVEMIR) 100 unit/mL injection, Inject 10 Units into the skin every evening., Disp: 10 mL, Rfl: 2    insulin syringes, disposable, 1 mL Syrg, 1 each by Misc.(Non-Drug; Combo Route) route before meals and at bedtime as needed (Per sliding scale)., Disp: 500 each, Rfl: 0    lancets (ACCU-CHEK SOFTCLIX LANCETS) Misc, 1 each by Misc.(Non-Drug; Combo Route) route 4 (four) times daily before meals and nightly., Disp: 200 each, Rfl: 0 Review of patient's allergies indicates:  No Known Allergies      Labs/Scans/Micro:    CBC:   Lab Results   Component Value Date    WBC 5.9 05/23/2022    HGB 9.9 (L) 05/23/2022    HCT 32.8 (L) 05/23/2022    MCV 90.4 05/23/2022     05/23/2022       Sedimentation rate: No results found for: SEDRATE C-reactive protein:   Lab Results   Component Value Date    .00 (H) 05/04/2022       HOME HEALTH AGENCY:  N/A    Patient Orders:  Follow up in about 2 weeks (around 6/29/2022) for Right gluteal/peineal .

## 2022-07-12 ENCOUNTER — HOSPITAL ENCOUNTER (OUTPATIENT)
Dept: RADIOLOGY | Facility: HOSPITAL | Age: 42
Discharge: HOME | End: 2022-07-12
Attending: INTERNAL MEDICINE
Payer: COMMERCIAL

## 2022-07-12 VITALS — WEIGHT: 284 LBS | BODY MASS INDEX: 44.57 KG/M2 | HEIGHT: 67 IN

## 2022-07-12 DIAGNOSIS — R92.8 ABNORMAL MAMMOGRAM: ICD-10-CM

## 2022-07-12 PROCEDURE — 77061 BREAST TOMOSYNTHESIS UNI: CPT | Mod: 26,LT,, | Performed by: STUDENT IN AN ORGANIZED HEALTH CARE EDUCATION/TRAINING PROGRAM

## 2022-07-12 PROCEDURE — 76642 ULTRASOUND BREAST LIMITED: CPT | Mod: 26,LT,, | Performed by: STUDENT IN AN ORGANIZED HEALTH CARE EDUCATION/TRAINING PROGRAM

## 2022-07-12 PROCEDURE — 77065 DX MAMMO INCL CAD UNI: CPT | Mod: 26,LT,, | Performed by: STUDENT IN AN ORGANIZED HEALTH CARE EDUCATION/TRAINING PROGRAM

## 2022-07-12 PROCEDURE — 76642 ULTRASOUND BREAST LIMITED: CPT | Mod: TC,LT

## 2022-07-12 PROCEDURE — 76642 US BREAST LEFT LIMITED: ICD-10-PCS | Mod: 26,LT,, | Performed by: STUDENT IN AN ORGANIZED HEALTH CARE EDUCATION/TRAINING PROGRAM

## 2022-07-12 PROCEDURE — 77065 MAMMO DIGITAL DIAGNOSTIC LEFT WITH TOMO: ICD-10-PCS | Mod: 26,LT,, | Performed by: STUDENT IN AN ORGANIZED HEALTH CARE EDUCATION/TRAINING PROGRAM

## 2022-07-12 PROCEDURE — 77065 DX MAMMO INCL CAD UNI: CPT | Mod: TC,LT

## 2022-07-12 PROCEDURE — 77061 MAMMO DIGITAL DIAGNOSTIC LEFT WITH TOMO: ICD-10-PCS | Mod: 26,LT,, | Performed by: STUDENT IN AN ORGANIZED HEALTH CARE EDUCATION/TRAINING PROGRAM

## 2022-07-25 ENCOUNTER — HOSPITAL ENCOUNTER (OUTPATIENT)
Dept: RADIOLOGY | Facility: HOSPITAL | Age: 42
Discharge: HOME | End: 2022-07-25
Attending: INTERNAL MEDICINE
Payer: COMMERCIAL

## 2022-07-25 DIAGNOSIS — R92.8 ABNORMAL MAMMOGRAM: ICD-10-CM

## 2022-07-25 PROCEDURE — 27000549 US BREAST BIOPSY WITH IMAGING EA ADDITIONAL

## 2022-07-25 PROCEDURE — 77061 BREAST TOMOSYNTHESIS UNI: CPT | Mod: 26,LT,, | Performed by: STUDENT IN AN ORGANIZED HEALTH CARE EDUCATION/TRAINING PROGRAM

## 2022-07-25 PROCEDURE — 19083 BX BREAST 1ST LESION US IMAG: CPT | Mod: LT,,, | Performed by: STUDENT IN AN ORGANIZED HEALTH CARE EDUCATION/TRAINING PROGRAM

## 2022-07-25 PROCEDURE — 77061 MAMMO DIGITAL DIAGNOSTIC LEFT WITH TOMO: ICD-10-PCS | Mod: 26,LT,, | Performed by: STUDENT IN AN ORGANIZED HEALTH CARE EDUCATION/TRAINING PROGRAM

## 2022-07-25 PROCEDURE — 27000549 US BREAST BIOPSY WITH IMAGING 1ST SITE LEFT

## 2022-07-25 PROCEDURE — 77065 DX MAMMO INCL CAD UNI: CPT | Mod: TC,LT

## 2022-07-25 PROCEDURE — 19083 US BREAST BIOPSY WITH IMAGING EA ADDITIONAL: ICD-10-PCS | Mod: LT,,, | Performed by: STUDENT IN AN ORGANIZED HEALTH CARE EDUCATION/TRAINING PROGRAM

## 2022-07-25 PROCEDURE — 77065 MAMMO DIGITAL DIAGNOSTIC LEFT WITH TOMO: ICD-10-PCS | Mod: 26,LT,, | Performed by: STUDENT IN AN ORGANIZED HEALTH CARE EDUCATION/TRAINING PROGRAM

## 2022-07-25 PROCEDURE — 77065 DX MAMMO INCL CAD UNI: CPT | Mod: 26,LT,, | Performed by: STUDENT IN AN ORGANIZED HEALTH CARE EDUCATION/TRAINING PROGRAM

## 2022-07-25 PROCEDURE — 19084 PR BX BRST, EA ADD'L LESION, US GUIDANCE: ICD-10-PCS | Mod: LT,,, | Performed by: STUDENT IN AN ORGANIZED HEALTH CARE EDUCATION/TRAINING PROGRAM

## 2022-07-25 PROCEDURE — 19084 BX BREAST ADD LESION US IMAG: CPT | Mod: LT,,, | Performed by: STUDENT IN AN ORGANIZED HEALTH CARE EDUCATION/TRAINING PROGRAM

## 2022-07-25 PROCEDURE — 19083 BX BREAST 1ST LESION US IMAG: CPT | Mod: LT

## 2022-07-27 LAB
ESTROGEN SERPL-MCNC: NORMAL PG/ML
INSULIN SERPL-ACNC: NORMAL U[IU]/ML
LAB AP CLINICAL INFORMATION: NORMAL
LAB AP GROSS DESCRIPTION: NORMAL
LAB AP REPORT FOOTNOTES: NORMAL
T3RU NFR SERPL: NORMAL %

## 2023-04-04 ENCOUNTER — PATIENT MESSAGE (OUTPATIENT)
Dept: RESEARCH | Facility: HOSPITAL | Age: 43
End: 2023-04-04
Payer: COMMERCIAL

## 2024-12-26 ENCOUNTER — HOSPITAL ENCOUNTER (EMERGENCY)
Facility: HOSPITAL | Age: 44
Discharge: HOME | End: 2024-12-26
Payer: COMMERCIAL

## 2024-12-26 VITALS
SYSTOLIC BLOOD PRESSURE: 180 MMHG | TEMPERATURE: 98 F | OXYGEN SATURATION: 100 % | HEIGHT: 66 IN | WEIGHT: 280 LBS | BODY MASS INDEX: 45 KG/M2 | HEART RATE: 100 BPM | RESPIRATION RATE: 18 BRPM | DIASTOLIC BLOOD PRESSURE: 103 MMHG

## 2024-12-26 DIAGNOSIS — N76.4 LEFT GENITAL LABIAL ABSCESS: Primary | ICD-10-CM

## 2024-12-26 PROCEDURE — 99284 EMERGENCY DEPT VISIT MOD MDM: CPT | Mod: 25

## 2024-12-26 PROCEDURE — 63600175 PHARM REV CODE 636 W HCPCS: Performed by: NURSE PRACTITIONER

## 2024-12-26 PROCEDURE — 56405 I&D VULVA/PERINEAL ABSCESS: CPT

## 2024-12-26 RX ORDER — AMOXICILLIN AND CLAVULANATE POTASSIUM 875; 125 MG/1; MG/1
1 TABLET, FILM COATED ORAL 2 TIMES DAILY
Qty: 14 TABLET | Refills: 0 | Status: SHIPPED | OUTPATIENT
Start: 2024-12-26 | End: 2025-01-02

## 2024-12-26 RX ORDER — HYDROCODONE BITARTRATE AND ACETAMINOPHEN 5; 325 MG/1; MG/1
1 TABLET ORAL EVERY 6 HOURS PRN
Qty: 12 TABLET | Refills: 0 | Status: SHIPPED | OUTPATIENT
Start: 2024-12-26 | End: 2024-12-29

## 2024-12-26 RX ORDER — LIDOCAINE HYDROCHLORIDE 10 MG/ML
1 INJECTION, SOLUTION INFILTRATION; PERINEURAL ONCE
Status: COMPLETED | OUTPATIENT
Start: 2024-12-26 | End: 2024-12-26

## 2024-12-26 RX ADMIN — LIDOCAINE HYDROCHLORIDE 1 ML: 10 INJECTION, SOLUTION INFILTRATION; PERINEURAL at 11:12

## 2024-12-26 NOTE — ED PROVIDER NOTES
Encounter Date: 2024       History     Chief Complaint   Patient presents with    Cyst     PT C/O boil to vaginal area, hx of needing to have it lanced.     44 year old female presents with a boil to labia.         The history is provided by the patient. No  was used.     Review of patient's allergies indicates:  No Known Allergies  Past Medical History:   Diagnosis Date    Diabetes mellitus     Hypertension      Past Surgical History:   Procedure Laterality Date     SECTION      x3    JOINT REPLACEMENT Left     left forefinger has screw    TUBAL LIGATION      WOUND DEBRIDEMENT N/A 2022    Procedure: DEBRIDEMENT, WOUND;  Surgeon: Michael Ordoñez MD;  Location: [facility] OR;  Service: General;  Laterality: N/A;     Family History   Problem Relation Name Age of Onset    Diabetes Mother      Liver cancer Mother      Heart failure Mother      Hypertension Father      Liver cancer Father      Heart failure Father      Breast cancer Cousin maternal 50    Breast cancer Cousin maternal 50     Social History     Tobacco Use    Smoking status: Former     Current packs/day: 0.00     Average packs/day: 0.3 packs/day for 10.0 years (2.5 ttl pk-yrs)     Types: Cigarettes     Start date: 2011     Quit date: 2021     Years since quitting: 3.6    Smokeless tobacco: Never   Substance Use Topics    Alcohol use: Yes     Alcohol/week: 1.0 standard drink of alcohol     Types: 1 Glasses of wine per week    Drug use: Never     Review of Systems   Skin:  Positive for wound.   All other systems reviewed and are negative.      Physical Exam     Initial Vitals [24 1000]   BP Pulse Resp Temp SpO2   (!) 213/123 (!) 113 18 98.8 °F (37.1 °C) 100 %      MAP       --         Physical Exam    Nursing note and vitals reviewed.  Constitutional: She appears well-developed and well-nourished.   HENT:   Head: Normocephalic and atraumatic.   Eyes: Conjunctivae and EOM are normal. Pupils are equal, round,  and reactive to light.   Neck: Neck supple.   Normal range of motion.  Cardiovascular:  Normal rate, regular rhythm, normal heart sounds and intact distal pulses.           Pulmonary/Chest: Breath sounds normal.   Abdominal: Abdomen is soft. Bowel sounds are normal.   Genitourinary: There is tenderness and lesion on the left labia.    Genitourinary Comments: Large abscess is noted upon assessment.          Musculoskeletal:         General: Normal range of motion.      Cervical back: Normal range of motion and neck supple.     Neurological: She is alert and oriented to person, place, and time. She has normal strength.   Skin: Skin is warm and dry. Capillary refill takes less than 2 seconds.   Psychiatric: She has a normal mood and affect. Her behavior is normal. Judgment and thought content normal.         ED Course   I & D - Incision and Drainage    Date/Time: 12/26/2024 9:56 AM  Location procedure was performed: [facility] EMERGENCY DEPARTMENT    Performed by: Ayse Negron FNP  Authorized by: Ayse Negron FNP  Pre-operative diagnosis: abscess  Post-operative diagnosis: abscess  Consent Done: Not Needed  Type: abscess  Body area: anogenital (Left Labia)  Anesthesia: local infiltration    Anesthesia:  Local Anesthetic: lidocaine 1% without epinephrine  Anesthetic total: 10 mL    Patient sedated: no  Scalpel size: 11  Incision type: single straight  Incision depth: subcutaneous  Complexity: simple  Drainage: bloody and pus  Drainage amount: moderate  Wound treatment: incision, drainage, expression of material and wound packed  Packing material: 1/4 in iodoform gauze  Complications: No  Estimated blood loss (mL): 3  Specimens: No  Implants: No  Patient tolerance: Patient tolerated the procedure well with no immediate complications    Incision depth: subcutaneous        Labs Reviewed - No data to display       Imaging Results    None          Medications   LIDOcaine HCL 10 mg/ml (1%) injection 1 mL (has no administration in  time range)     Medical Decision Making  Problems Addressed:  Left genital labial abscess: acute illness or injury    Risk  Prescription drug management.                                      Clinical Impression:  Final diagnoses:  [N76.4] Left genital labial abscess (Primary)          ED Disposition Condition    Discharge Stable          ED Prescriptions       Medication Sig Dispense Start Date End Date Auth. Provider    amoxicillin-clavulanate 875-125mg (AUGMENTIN) 875-125 mg per tablet Take 1 tablet by mouth 2 (two) times daily. for 7 days 14 tablet 12/26/2024 1/2/2025 Ayse Negron FNP    HYDROcodone-acetaminophen (NORCO) 5-325 mg per tablet Take 1 tablet by mouth every 6 (six) hours as needed for Pain. 12 tablet 12/26/2024 12/29/2024 Ayse Negron FNP          Follow-up Information       Follow up With Specialties Details Why Contact Info    Nelson Riggins III, MD Internal Medicine In 3 days If symptoms worsen [address]  [address]  [place] [address]  [phone]               Ayse Negron FNP  12/26/24 [id number]

## 2024-12-26 NOTE — Clinical Note
"Shell"Ko Rolon was seen and treated in our emergency department on 12/26/2024.  She may return to work on 12/30/2024.       If you have any questions or concerns, please don't hesitate to call.      Ayse Negron, LUIS"

## (undated) DEVICE — GAUZE DERMACEA 4 PLY 3X3IN

## (undated) DEVICE — SEE MEDLINE ITEM 157181

## (undated) DEVICE — GLOVE PROTEXIS HYDROGEL SZ6.5

## (undated) DEVICE — SYR 10CC LUER LOCK

## (undated) DEVICE — LEGGINGS 48X31 INCH

## (undated) DEVICE — Device

## (undated) DEVICE — NDL ECLIPSE HYPO 22GA 1IN

## (undated) DEVICE — GLOVE PROTEXIS BLUE LATEX 7

## (undated) DEVICE — TRAY DRY SKIN SCRUB PREP

## (undated) DEVICE — TAPE ADH MEDIPORE 4 X 10YDS

## (undated) DEVICE — GLOVE PROTEXIS LTX 6.5

## (undated) DEVICE — SUT CTD VICRYL 3-0 CR/SH

## (undated) DEVICE — PAD ELECTROSURGICAL SPL W/CORD

## (undated) DEVICE — SPONGE DERMACEA GAUZE 4X4